# Patient Record
Sex: FEMALE | Race: WHITE | NOT HISPANIC OR LATINO | Employment: FULL TIME | URBAN - METROPOLITAN AREA
[De-identification: names, ages, dates, MRNs, and addresses within clinical notes are randomized per-mention and may not be internally consistent; named-entity substitution may affect disease eponyms.]

---

## 2024-07-02 RX ORDER — CARVEDILOL 3.12 MG/1
1.56 TABLET ORAL 2 TIMES DAILY WITH MEALS
COMMUNITY

## 2024-07-02 RX ORDER — SERTRALINE HYDROCHLORIDE 150 MG/1
150 CAPSULE ORAL DAILY
Status: ON HOLD | COMMUNITY
End: 2024-08-28

## 2024-07-02 RX ORDER — ISOSORBIDE DINITRATE 30 MG/1
30 TABLET ORAL
Status: ON HOLD | COMMUNITY
End: 2024-08-28

## 2024-07-02 RX ORDER — ATORVASTATIN CALCIUM 20 MG/1
20 TABLET, FILM COATED ORAL DAILY
Status: ON HOLD | COMMUNITY
End: 2024-08-28

## 2024-07-02 RX ORDER — CLOPIDOGREL BISULFATE 75 MG/1
75 TABLET ORAL DAILY
COMMUNITY

## 2024-07-02 RX ORDER — BUPROPION HYDROCHLORIDE 150 MG/1
150 TABLET ORAL EVERY MORNING
COMMUNITY

## 2024-07-02 RX ORDER — ASPIRIN 81 MG/1
81 TABLET ORAL AT BEDTIME
Status: ON HOLD | COMMUNITY
End: 2024-08-30

## 2024-08-06 NOTE — H&P (VIEW-ONLY)
PREOPERATIVE ASSESSMENT  Fax number: [phone] and [phone]      History of Present Illness  Rosario is here today alone for a preoperative consultation to evaluate the patient's perioperative risk prior to surgery.     Condition requiring surgery: Rosario has had left knee pain for the past several years.  She recalls receiving cortisone injections over a year ago which were not effective.  She was found to have advanced degenerative changes.  Due to persistence of symptoms she is wishing to undergo the procedure below.    Date of Surgery: August 20, 2024  Facility: [facility]  Surgeon: Christiano Hurst  Procedure: Left knee arthroplasty    Subjective   Do you ever have any pain or discomfort in your chest? no  Do you have swelling in your feet or ankles at times? no  Are you troubled by shortness of breath when:       Walking on the level? no       Walking up flight of stairs or slight hill? no       Sleeping at night? no  Do you sometimes get pains in the calves of your legs when you walk? no  Does your chest ever sound wheezy or whistling? no  Do you currently or in the last 2 weeks, have you had, a cold, bronchitis or other respiratory infection? no  Do you usually have a cough? no  Do you or does anyone in your family have a bleeding or clotting problem? no  Have you taken any aspirin, other blood thinners, or arthritis medicine in the last 2 weeks? yes  Have you ever had problems with anemia or been told to take iron pills? no  Have you had any black, tarry or bloody stools, (for women) abnormal vaginal bleeding? no  Have you or any of your relatives ever had problems with anesthesia? no  For Women: When was your last period? No LMP recorded. Patient has had an ablation.    Surgical Risk Factors:  Rosario does have a history of coronary artery disease.  Rosario has a history of CAD and over 2 years ago had a small non-Q wave infarct with no evidence of significant coronary artery disease so a question  of coronary spasm was entertained.  She last had a angiogram on 4/2/2022 was demonstrated left main is normal.  Left anterior descending artery has multiple stents in the distal segment.  The most very distal portion of the stents appears to be in a false lumen of a previous left coronary dissection.  The true lumen is widely patent.  This was seen on prior angiogram is unchanged.  Left circumflex is a large but nondominant and free of significant disease.  Right coronary artery is dominant vessel and free of significant disease.  That time medical management was recommended with statin therapy as well as aspirin indefinitely.    She does not have history of lung disease..  STOP-Bang Total Score: 3 (8/6/2024  7:26 AM)    Patient Active Problem List   Diagnosis     CAD (coronary artery disease)     Vitamin D deficiency     Status post percutaneous transluminal coronary angioplasty     Pure hypercholesterolemia     Major depressive disorder with single episode, in full remission ([facility])     Past Medical History:   Diagnosis Date     Anteroapical myocardial infarction ([facility]) 06/01/2012    periprocedural     ASCUS with positive high risk HPV 08/2014     CAD (coronary artery disease) 06/01/2012 4/2/2022: LHC: Stable coronary artery disease unchanged compared to prior angiogram, LAD has multiple stents in its distal segment. most very distal portion of the stents appears to be in a false lumen of a previous left coronary artery dissection.  The true lumen is widely patent. This was seen on prior angiograms and is unchanged 6/1/2012:  PCI to LAD 90% s/p angioplasty resulting in dissection      Depression      Miscarriage 1995     Mucous cyst of digit of left hand 08/28/2019     Non-ST elevated myocardial infarction ([facility]) 04/2022    s/p angiogram - stable disease     NSTEMI (non-ST elevated myocardial infarction) ([facility]) 11/04/2014    ASA increased to 325 mg for 6 months     Pseudoangiomatous stromal hyperplasia of breast  2013    right     TMJ syndrome     R>L     Ventricular thrombus following MI (myocardial infarction) ([facility]) 06/05/2012    noted on MRI     Vitamin D deficiency 2014     Past Surgical History:   Procedure Laterality Date     BREAST BIOPSY Right 10/03/2013    [facility]     CARDIAC CATHETERIZATION  06/01/2012    LAD angioplasty complicated by dissection and MI s/p 3 stents (Xience drug eluting and 2 bare metal), apical mural thrombus     CARDIAC CATHETERIZATION  11/05/2014    LAD 70% in-stent stenosis     CARDIAC CATHETERIZATION  03/04/2015    distal stent occluded     CARDIAC CATHETERIZATION  04/2022     COLPOSCOPY  10/14/2014     CORONARY STENT PLACEMENT  06/01/2012    3 stents     INSERTION OF INTRAUTERINE DEVICE  8/2001, 8/2011     IUD REMOVAL  03/2018    and uterine ablation     KNEE ARTHROSCOPY  02/15/2010    right lateral meniscus     KNEE ARTHROSCOPY Left 05/2021     SURGERY Right 10/31/2013    breast hematoma requiring evacuation     Current Outpatient Medications   Medication Sig     aspirin (AKA: CHILDREN'S ASPIRIN) 81 mg oral Tablet, Chewable Chew and Swallow 1 Tablet (81 mg) by mouth at bedtime.     atorvastatin (LIPITOR) 10 mg oral Tablet Take 1 Tablet (10 mg) by mouth at bedtime.     buPROPion (WELLBUTRIN XL) 150 mg oral Tablet Sustained Release 24HR Take 1 Tablet (150 mg) by mouth in the morning.     carvediloL (COREG) 3.125 mg oral Tablet Take 0.5 Tablets (1.5625 mg) by mouth in the morning and 0.5 Tablets (1.5625 mg) in the evening.     Cholecalciferol, Vitamin D3, (VITAMIN D3) 2,000 unit oral Capsule Take 2,000 Int'l Units by mouth Daily.     clopidogreL (PLAVIX) 75 mg oral Tablet Take 1 Tablet (75 mg) by mouth in the morning.     fish oil-omega-3 fatty acids 340-1,000 mg oral Capsule Take 1 Capsule by mouth in the evening.     isosorbide mononitrate (IMDUR) 30 mg oral Tablet Sustained Release 24HR Take 2 Tablets (60 mg) by mouth in the morning. (Patient taking differently: Take 1 Tablet (30 mg) by mouth  "in the morning.)     metroNIDAZOLE (METROCREAM) 0.75 % topical Cream Apply to the affected area(s) if needed.     multivitamin therapeutic oral Tablet Take 1 Tablet by mouth.     sertraline (ZOLOFT) 100 mg oral Tablet 1.5 tablets daily     traZODone (DESYREL) 50 mg oral Tablet Take 0.5-2 Tablets ( mg) by mouth at bedtime.     Allergies as of 08/06/2024 - Reviewed 08/06/2024   Allergen Reaction Noted     Codeine Unknown Reaction      Social History     Tobacco Use     Smoking status: Never     Smokeless tobacco: Never   Substance Use Topics     Alcohol use: Not Currently     Alcohol/week: 0.0 standard drinks of alcohol     Comment: ocassional drink      Social History     Substance and Sexual Activity   Drug Use No      Family History   Problem Relation Name Age of Onset     Other Mother          a fib s/p ablation, s/p pacemaker     Cancer (other) Father          Mantle cell lymphoma     Hypertension Father       Arthritis Sister Beatriz         B knee replacements, one hip replacement     Lung Cancer Sister Shahana 56       Review of Systems  A 10+ Review of Systems was completed and is negative, other than pertinent positives above.  Rosario has coronary artery disease and is currently on Plavix, aspirin, Lipitor, isosorbide and Coreg.  She denies chest pain, pressure or palpitations.  No new or worsening shortness of breath.  She takes sertraline and Wellbutrin for major depressive disorder which is currently in full remission.  She is tolerating well without side effects.  Stopping score today of 3.  Denies concerning symptoms.     Objective  /81 (BP Source: R arm, BP position: Sitting)   Pulse 66   Temp 97.1  F (36.2  C)   Ht 176 cm (69.29\")   Wt 77.8 kg (171 lb 8 oz)   Breastfeeding No   BMI 25.11 kg/m       Physical Exam   GENERAL: No acute distress.  SKIN:  No rashes, jaundice or suspicious lesions.  HEENT:  Head is atraumatic and normocephalic. Conjunctivae normal and sclera clear.  Ear canals " clear. TMs clear with normal landmarks. No oral lesions.  Throat without erythema or exudate.   NECK:  Neck supple without thyromegaly  LYMPH NODES:  No cervical or supraclavicular adenopathy.  LUNGS:  Clear with normal respirations. No crackles or wheezing.  HEART:  Regular without murmurs. No edema. Normal radial pulses.  ABDOMEN:  Soft, nontender and nondistended with normal bowel sounds, and no bruits, hepatosplenomegaly or masses.  MSK:  Normal muscle bulk. No effusions.  NEURO:  Alert and attentive. No tremor. Gait is normal. Motor exam without focal deficits.    WBC Count, Corrected   Date Value Ref Range Status   08/06/2024 3.7 3.7 - 12.1 10(3)/uL Final     RBC Count   Date Value Ref Range Status   08/06/2024 4.34 3.76 - 5.39 10(6)/uL Final     Hemoglobin   Date Value Ref Range Status   08/06/2024 13.4 11.2 - 15.8 g/dL Final     Hematocrit   Date Value Ref Range Status   08/06/2024 39.4 33.9 - 47.5 % Final     MCV   Date Value Ref Range Status   08/06/2024 90.8 80.6 - 98.5 fL Final     MCH   Date Value Ref Range Status   08/06/2024 30.9 26.4 - 32.9 pg Final     MCHC   Date Value Ref Range Status   08/06/2024 34.1 32.0 - 36.0 g/dL Final     RDW   Date Value Ref Range Status   08/06/2024 12.3 10.0 - 16.8 % Final     Platelets   Date Value Ref Range Status   08/06/2024 191 179 - 450 10(3)/uL Final     MPV   Date Value Ref Range Status   08/06/2024 8.4 7.4 - 10.4 fL Final     Abs Neutrophils   Date Value Ref Range Status   08/06/2024 2.0 1.8 - 9.2 10(3)/uL Final     % Neutrophils   Date Value Ref Range Status   08/06/2024 52.9 43.0 - 80.0 % Final     Abs Lymphocytes   Date Value Ref Range Status   08/06/2024 1.2 1.2 - 3.9 10(3)/uL Final     % Lymphocytes   Date Value Ref Range Status   08/06/2024 32.4 16.0 - 49.0 % Final     Abs Monocytes   Date Value Ref Range Status   08/06/2024 0.4 0.0 - 1.1 10(3)/uL Final     % Monocytes   Date Value Ref Range Status   08/06/2024 10.5 (H) 0.0 - 10.0 % Final     Abs  Eosinophils   Date Value Ref Range Status   08/06/2024 0.1 0.0 - 0.8 10(3)/uL Final     % Eosinophils   Date Value Ref Range Status   08/06/2024 3.6 0.0 - 7.0 % Final     Abs Basophils   Date Value Ref Range Status   08/06/2024 0.0 0.0 - 0.2 10(3)/uL Final     % Basophils   Date Value Ref Range Status   08/06/2024 0.6 0.0 - 2.0 % Final     ECG dated 5/28/2024:  Sinus bradycardia  ST and T wave abnormality, consider anterior ischemia  Abnormal ECG  When compared to ECG of May 19, 2023  No significant change was found    Personally reviewed each individual lab and ECG.      Assessment/ Plan  1.  Preoperative evaluation prior to undergoing left total knee arthroplasty; patient's health was optimized to undergo the above-named procedure.  She is hemodynamically stable.  Hemoglobin and potassium today are normal.  Electrocardiogram is unchanged.  She is instructed to hold all herbals and anti-inflammatories 1 week prior to surgery.  STOP-BANG score today of 3.  Denies concerning symptoms.  2.  Coronary artery disease.  She will take Coreg and isosorbide as scheduled a.m. of surgery.  She will hold Plavix and aspirin 5 days prior to surgery, unless otherwise directed by surgeon.  3.  Major depressive disorder, mild, currently in remission.  Continue current combination of Wellbutrin and sertraline.  4.  A copy of this preoperative evaluation will be forwarded to .    Thank you very much for this consultation with Mrs. HuizarQuetas.      Dori Smith, CNP

## 2024-08-23 RX ORDER — TRAZODONE HYDROCHLORIDE 50 MG/1
50-100 TABLET, FILM COATED ORAL AT BEDTIME
COMMUNITY
Start: 2024-01-23

## 2024-08-23 RX ORDER — CHLORAL HYDRATE 500 MG
1 CAPSULE ORAL DAILY
COMMUNITY

## 2024-08-23 RX ORDER — MULTIVITAMIN,THERAPEUTIC
1 TABLET ORAL DAILY
Status: ON HOLD | COMMUNITY
End: 2024-08-30

## 2024-08-27 ENCOUNTER — ANESTHESIA EVENT (OUTPATIENT)
Dept: SURGERY | Facility: CLINIC | Age: 54
End: 2024-08-27
Payer: COMMERCIAL

## 2024-08-27 NOTE — TREATMENT PLAN
"Orthopedic Surgery Pre-Op Plan: Rosario Walls  pre-op review. This is NOT an H&P   Surgeon: Dr. Hurst   [facility]: [facility]  Name of Surgery: Left Total Knee Arthroplasty   Date of Surgery: 8/28/24  H&P: Completed on 8/6/24 by MALAIKA Truong, CNP at [facility] [place], [place].   History of ASA, NSAIDS, vitamin and/or herbal supplements, GLP-1 Agonist or SGLT Inhibitor medication taken within 10 days?: Yes: on ASA 81 mg daily for CAD S/P stents x3 to LAD in 2012. Due to CAD and stent history, patient will continue to take ASA 81 mg daily through day before surgery (last dose on 8/27/24) and hold on day of surgery.  Fish oil, multivitamins-patient instructed to hold these supplements and vitamins for 7 days before surgery.  History of blood thinners?: Yes: on dual antiplatelet therapy with clopidogrel (Plavix) and ASA 81 mg daily. Per Cardiology recommendations, patient will hold Plavix 5 days before surgery (take last dose on 8/22/24, then hold). We will alert Anesthesia of 5 day Plavix hold in case this impacts ability for patient to have spinal/neuraxial anesthesia with this knee surgery.     Plan:   1) Discharge Plan: Home morning of POD 1 with assist of Family (Spouse, Mikie).  Please see Discharge Planning section near bottom of this note for further details.     2) Coronary Artery Disease: History of MI in 2012 and S/P Stents x3 in 2012: Doing well from cardiovascular standpoint at recent Cardiology visit with Anny Novak PA-C, [facility], from 8/23/24: Denies any resting or exertional chest pain or shortness of breath. Denies PND, apnea or lower extremity edema. Able to perform > 4 METS without any limitations. Per Cardiology:   \" From a cardiac standpoint Rosario appears to be optimized. She denies recurrent angina and has no heart failure symptoms. She can perform at least 4 METS of activity without limitations. Given her prior cardiac history she would be considered at " "least moderate risk to undergo anesthesia. EKG reviewed from May without [place] can EKG changes. Rosario can undergo scheduled surgery as planned. Please contact our clinic for any questions or concerns.\"     Patient also cleared by PCP for surgery. On dual antiplatelet therapy with ASA 81 mg daily and clopidogrel (Plavix).  Patient was instructed by Cardiology to hold Plavix for 5 days before surgery (will take last dose on 8/22/2024, then hold).  Will also stay on ASA 81 mg through the day before surgery (will take last dose on 8/27/2024, then hold on day of surgery).  After surgery, we will plan to resume both ASA 81 mg and Plavix as soon as safe from a bleeding standpoint per Dr. Hurst's team.     3) Hyperlipidemia: On atorvastatin.    4) Hypertension: Appears well-controlled on carvedilol.  Patient was instructed to continue taking carvedilol, including taking it on the morning of surgery.    5) Depression: On sertraline and bupropion.    Patient appears medically optimized for upcoming surgery. I would recommend Hospitalist Consult to assist with medical management. Please call me below with any questions on this patient.       Review of Systems Notable for: Coronary artery disease-history of MI in 2012 and s/p stents x 3 in 2012, hyperlipidemia, hypertension, depression.    Past Medical History:   Past Medical History:   Diagnosis Date    Antiplatelet or antithrombotic long-term use     Arthritis     Coronary artery disease     Heart attack (H)     Hypertension     Stented coronary artery      History reviewed. No pertinent surgical history.    Current Medications:  Patient's Medications   New Prescriptions    No medications on file   Previous Medications    ASPIRIN 81 MG EC TABLET    Take 81 mg by mouth daily Last dose to be taken on 8/27    ATORVASTATIN (LIPITOR) 20 MG TABLET    Take 20 mg by mouth daily    BUPROPION (WELLBUTRIN XL) 150 MG 24 HR TABLET    Take 150 mg by mouth every morning    CARVEDILOL " (COREG) 3.125 MG TABLET    Take 3.125 mg by mouth 2 times daily (with meals)    CLOPIDOGREL (PLAVIX) 75 MG TABLET    Take 75 mg by mouth daily Last dose to be taken on 8/20. PCP wants last dose to be on 8/23, pt to call MALAIKA Bailey NP-C for follow up and clarification.    FISH OIL-OMEGA-3 FATTY ACIDS 1000 MG CAPSULE    Take 1 capsule by mouth daily.    ISOSORBIDE DINITRATE (ISORDIL) 30 MG TABLET    Take 30 mg by mouth 3 times daily    MULTIPLE VITAMIN (THERA-TABS) TABS    Take 1 tablet by mouth daily.    SERTRALINE  MG CAPS    Take 150 mg by mouth daily.    TRAZODONE (DESYREL) 50 MG TABLET    Take 0.5-2 tablets by mouth at bedtime.   Modified Medications    No medications on file   Discontinued Medications    No medications on file       ALLERGIES:  Allergies   Allergen Reactions    Codeine        Social History  Social History     Tobacco Use    Smoking status: Never    Smokeless tobacco: Never   Substance Use Topics    Alcohol use: Yes     Comment: 2-3 per week    Drug use: Not Currently       Any Abnormal Recent Diagnostics? Yes  Blood glucose 102 on 8/6/2024: Very mildly elevated.  No known history of prediabetes or diabetes.  We will monitor BG's during hospital stay.  Goal BG is <180.     Discharge Planning:     Home morning of POD 1 with assist of Family (Spouse, Mikie).    07/02/24 1158   Discharge Planning   Patient/Family Anticipates Transition to home with family   Living Arrangements   People in Home spouse   Type of Residence Private Residence   Is your private residence a single family home or apartment? Single family home   Number of Stairs, Within Home, Primary ten   Stair Railings, Within Home, Primary railings safe and in good condition   Bathroom Shower/Tub Tub/Shower unit   Equipment Currently Used at Home none   Support System   Support Systems Spouse/Significant Other   Do you have someone available to stay with you one or two nights once you are home? Yes       MALAIKA Arias,  CNP   Advanced Practice Nurse Navigator- Orthopedics  [facility]   Phone: [phone]

## 2024-08-28 ENCOUNTER — HOSPITAL ENCOUNTER (OUTPATIENT)
Facility: CLINIC | Age: 54
Discharge: HOME | End: 2024-08-30
Attending: STUDENT IN AN ORGANIZED HEALTH CARE EDUCATION/TRAINING PROGRAM | Admitting: STUDENT IN AN ORGANIZED HEALTH CARE EDUCATION/TRAINING PROGRAM
Payer: COMMERCIAL

## 2024-08-28 ENCOUNTER — ANESTHESIA (OUTPATIENT)
Dept: SURGERY | Facility: CLINIC | Age: 54
End: 2024-08-28
Payer: COMMERCIAL

## 2024-08-28 ENCOUNTER — ANCILLARY PROCEDURE (OUTPATIENT)
Dept: ULTRASOUND IMAGING | Facility: CLINIC | Age: 54
End: 2024-08-28
Payer: COMMERCIAL

## 2024-08-28 ENCOUNTER — APPOINTMENT (OUTPATIENT)
Dept: RADIOLOGY | Facility: CLINIC | Age: 54
End: 2024-08-28
Attending: STUDENT IN AN ORGANIZED HEALTH CARE EDUCATION/TRAINING PROGRAM
Payer: COMMERCIAL

## 2024-08-28 DIAGNOSIS — M17.12 PRIMARY OSTEOARTHRITIS OF LEFT KNEE: Primary | ICD-10-CM

## 2024-08-28 LAB — GLUCOSE BLDC GLUCOMTR-MCNC: 96 MG/DL (ref 70–99)

## 2024-08-28 PROCEDURE — 250N000013 HC RX MED GY IP 250 OP 250 PS 637

## 2024-08-28 PROCEDURE — 370N000017 HC ANESTHESIA TECHNICAL FEE, PER MIN: Performed by: STUDENT IN AN ORGANIZED HEALTH CARE EDUCATION/TRAINING PROGRAM

## 2024-08-28 PROCEDURE — C1776 JOINT DEVICE (IMPLANTABLE): HCPCS | Performed by: STUDENT IN AN ORGANIZED HEALTH CARE EDUCATION/TRAINING PROGRAM

## 2024-08-28 PROCEDURE — 999N000065 XR KNEE PORT LEFT 1/2 VIEWS: Mod: LT

## 2024-08-28 PROCEDURE — 258N000003 HC RX IP 258 OP 636

## 2024-08-28 PROCEDURE — 250N000009 HC RX 250: Performed by: NURSE ANESTHETIST, CERTIFIED REGISTERED

## 2024-08-28 PROCEDURE — 250N000013 HC RX MED GY IP 250 OP 250 PS 637: Performed by: STUDENT IN AN ORGANIZED HEALTH CARE EDUCATION/TRAINING PROGRAM

## 2024-08-28 PROCEDURE — 250N000011 HC RX IP 250 OP 636

## 2024-08-28 PROCEDURE — 710N000010 HC RECOVERY PHASE 1, LEVEL 2, PER MIN: Performed by: STUDENT IN AN ORGANIZED HEALTH CARE EDUCATION/TRAINING PROGRAM

## 2024-08-28 PROCEDURE — 258N000001 HC RX 258: Performed by: STUDENT IN AN ORGANIZED HEALTH CARE EDUCATION/TRAINING PROGRAM

## 2024-08-28 PROCEDURE — 250N000011 HC RX IP 250 OP 636: Performed by: STUDENT IN AN ORGANIZED HEALTH CARE EDUCATION/TRAINING PROGRAM

## 2024-08-28 PROCEDURE — 999N000141 HC STATISTIC PRE-PROCEDURE NURSING ASSESSMENT: Performed by: STUDENT IN AN ORGANIZED HEALTH CARE EDUCATION/TRAINING PROGRAM

## 2024-08-28 PROCEDURE — 250N000009 HC RX 250: Performed by: STUDENT IN AN ORGANIZED HEALTH CARE EDUCATION/TRAINING PROGRAM

## 2024-08-28 PROCEDURE — 272N000001 HC OR GENERAL SUPPLY STERILE: Performed by: STUDENT IN AN ORGANIZED HEALTH CARE EDUCATION/TRAINING PROGRAM

## 2024-08-28 PROCEDURE — 250N000025 HC SEVOFLURANE, PER MIN: Performed by: STUDENT IN AN ORGANIZED HEALTH CARE EDUCATION/TRAINING PROGRAM

## 2024-08-28 PROCEDURE — 82962 GLUCOSE BLOOD TEST: CPT

## 2024-08-28 PROCEDURE — 250N000009 HC RX 250

## 2024-08-28 PROCEDURE — 360N000077 HC SURGERY LEVEL 4, PER MIN: Performed by: STUDENT IN AN ORGANIZED HEALTH CARE EDUCATION/TRAINING PROGRAM

## 2024-08-28 PROCEDURE — 258N000003 HC RX IP 258 OP 636: Performed by: NURSE ANESTHETIST, CERTIFIED REGISTERED

## 2024-08-28 PROCEDURE — 250N000011 HC RX IP 250 OP 636: Performed by: NURSE ANESTHETIST, CERTIFIED REGISTERED

## 2024-08-28 PROCEDURE — C1713 ANCHOR/SCREW BN/BN,TIS/BN: HCPCS | Performed by: STUDENT IN AN ORGANIZED HEALTH CARE EDUCATION/TRAINING PROGRAM

## 2024-08-28 PROCEDURE — 258N000003 HC RX IP 258 OP 636: Performed by: STUDENT IN AN ORGANIZED HEALTH CARE EDUCATION/TRAINING PROGRAM

## 2024-08-28 DEVICE — CRUCIATE RETAINING FEMORAL
Type: IMPLANTABLE DEVICE | Site: KNEE | Status: FUNCTIONAL
Brand: TRIATHLON

## 2024-08-28 DEVICE — BONE CEMENT SIMPLEX FULL DOSE 6191-1-001: Type: IMPLANTABLE DEVICE | Site: KNEE | Status: FUNCTIONAL

## 2024-08-28 DEVICE — TIBIAL BEARING INSERT - CS
Type: IMPLANTABLE DEVICE | Site: KNEE | Status: FUNCTIONAL
Brand: TRIATHLON

## 2024-08-28 DEVICE — PRIMARY TIBIAL BASEPLATE
Type: IMPLANTABLE DEVICE | Site: KNEE | Status: FUNCTIONAL
Brand: TRIATHLON

## 2024-08-28 DEVICE — PIN FIXATION SS FLUTE SQUARE L3.5 IN OD1/8 IN 7650-2038A: Type: IMPLANTABLE DEVICE | Site: KNEE | Status: FUNCTIONAL

## 2024-08-28 RX ORDER — CARVEDILOL 3.12 MG/1
1.56 TABLET, FILM COATED ORAL 2 TIMES DAILY WITH MEALS
Status: DISCONTINUED | OUTPATIENT
Start: 2024-08-28 | End: 2024-08-30 | Stop reason: HOSPADM

## 2024-08-28 RX ORDER — HYDROMORPHONE HCL IN WATER/PF 6 MG/30 ML
0.2 PATIENT CONTROLLED ANALGESIA SYRINGE INTRAVENOUS
Status: DISCONTINUED | OUTPATIENT
Start: 2024-08-28 | End: 2024-08-30 | Stop reason: HOSPADM

## 2024-08-28 RX ORDER — ONDANSETRON 4 MG/1
4 TABLET, ORALLY DISINTEGRATING ORAL EVERY 6 HOURS PRN
Status: DISCONTINUED | OUTPATIENT
Start: 2024-08-28 | End: 2024-08-30 | Stop reason: HOSPADM

## 2024-08-28 RX ORDER — CEPHALEXIN 500 MG/1
500 CAPSULE ORAL EVERY 6 HOURS SCHEDULED
Status: DISCONTINUED | OUTPATIENT
Start: 2024-08-29 | End: 2024-08-30 | Stop reason: HOSPADM

## 2024-08-28 RX ORDER — TRANEXAMIC ACID 650 MG/1
1950 TABLET ORAL ONCE
Status: COMPLETED | OUTPATIENT
Start: 2024-08-28 | End: 2024-08-28

## 2024-08-28 RX ORDER — CEFAZOLIN SODIUM/WATER 2 G/20 ML
2 SYRINGE (ML) INTRAVENOUS
Status: COMPLETED | OUTPATIENT
Start: 2024-08-28 | End: 2024-08-28

## 2024-08-28 RX ORDER — SODIUM CHLORIDE, SODIUM LACTATE, POTASSIUM CHLORIDE, CALCIUM CHLORIDE 600; 310; 30; 20 MG/100ML; MG/100ML; MG/100ML; MG/100ML
INJECTION, SOLUTION INTRAVENOUS CONTINUOUS
Status: DISCONTINUED | OUTPATIENT
Start: 2024-08-28 | End: 2024-08-29

## 2024-08-28 RX ORDER — ONDANSETRON 2 MG/ML
INJECTION INTRAMUSCULAR; INTRAVENOUS PRN
Status: DISCONTINUED | OUTPATIENT
Start: 2024-08-28 | End: 2024-08-28

## 2024-08-28 RX ORDER — ISOSORBIDE MONONITRATE 30 MG/1
30 TABLET, EXTENDED RELEASE ORAL DAILY
Status: DISCONTINUED | OUTPATIENT
Start: 2024-08-29 | End: 2024-08-30 | Stop reason: HOSPADM

## 2024-08-28 RX ORDER — CEFAZOLIN SODIUM/WATER 2 G/20 ML
2 SYRINGE (ML) INTRAVENOUS SEE ADMIN INSTRUCTIONS
Status: DISCONTINUED | OUTPATIENT
Start: 2024-08-28 | End: 2024-08-30 | Stop reason: HOSPADM

## 2024-08-28 RX ORDER — ONDANSETRON 2 MG/ML
4 INJECTION INTRAMUSCULAR; INTRAVENOUS EVERY 6 HOURS PRN
Status: DISCONTINUED | OUTPATIENT
Start: 2024-08-28 | End: 2024-08-30 | Stop reason: HOSPADM

## 2024-08-28 RX ORDER — LIDOCAINE 40 MG/G
CREAM TOPICAL
Status: DISCONTINUED | OUTPATIENT
Start: 2024-08-28 | End: 2024-08-30 | Stop reason: HOSPADM

## 2024-08-28 RX ORDER — BUPROPION HYDROCHLORIDE 150 MG/1
150 TABLET ORAL EVERY MORNING
Status: DISCONTINUED | OUTPATIENT
Start: 2024-08-29 | End: 2024-08-30 | Stop reason: HOSPADM

## 2024-08-28 RX ORDER — ATORVASTATIN CALCIUM 10 MG/1
10 TABLET, FILM COATED ORAL AT BEDTIME
Status: DISCONTINUED | OUTPATIENT
Start: 2024-08-28 | End: 2024-08-30 | Stop reason: HOSPADM

## 2024-08-28 RX ORDER — BISACODYL 10 MG
10 SUPPOSITORY, RECTAL RECTAL DAILY PRN
Status: DISCONTINUED | OUTPATIENT
Start: 2024-08-31 | End: 2024-08-30 | Stop reason: HOSPADM

## 2024-08-28 RX ORDER — MAGNESIUM HYDROXIDE 1200 MG/15ML
LIQUID ORAL PRN
Status: DISCONTINUED | OUTPATIENT
Start: 2024-08-28 | End: 2024-08-28 | Stop reason: HOSPADM

## 2024-08-28 RX ORDER — AMOXICILLIN 250 MG
1 CAPSULE ORAL 2 TIMES DAILY
Status: DISCONTINUED | OUTPATIENT
Start: 2024-08-28 | End: 2024-08-30 | Stop reason: HOSPADM

## 2024-08-28 RX ORDER — VITAMIN B COMPLEX
25 TABLET ORAL DAILY
Status: DISCONTINUED | OUTPATIENT
Start: 2024-08-29 | End: 2024-08-30 | Stop reason: HOSPADM

## 2024-08-28 RX ORDER — LIDOCAINE HYDROCHLORIDE 10 MG/ML
INJECTION, SOLUTION INFILTRATION; PERINEURAL PRN
Status: DISCONTINUED | OUTPATIENT
Start: 2024-08-28 | End: 2024-08-28

## 2024-08-28 RX ORDER — HYDROMORPHONE HCL IN WATER/PF 6 MG/30 ML
0.2 PATIENT CONTROLLED ANALGESIA SYRINGE INTRAVENOUS EVERY 5 MIN PRN
Status: DISCONTINUED | OUTPATIENT
Start: 2024-08-28 | End: 2024-08-28 | Stop reason: HOSPADM

## 2024-08-28 RX ORDER — OXYCODONE HYDROCHLORIDE 5 MG/1
10 TABLET ORAL EVERY 4 HOURS PRN
Status: DISCONTINUED | OUTPATIENT
Start: 2024-08-28 | End: 2024-08-30 | Stop reason: HOSPADM

## 2024-08-28 RX ORDER — OXYCODONE HYDROCHLORIDE 5 MG/1
5 TABLET ORAL EVERY 4 HOURS PRN
Status: DISCONTINUED | OUTPATIENT
Start: 2024-08-28 | End: 2024-08-30 | Stop reason: HOSPADM

## 2024-08-28 RX ORDER — ATORVASTATIN CALCIUM 10 MG/1
10 TABLET, FILM COATED ORAL AT BEDTIME
COMMUNITY

## 2024-08-28 RX ORDER — BUPIVACAINE HYDROCHLORIDE 7.5 MG/ML
INJECTION, SOLUTION INTRASPINAL
Status: COMPLETED | OUTPATIENT
Start: 2024-08-28 | End: 2024-08-28

## 2024-08-28 RX ORDER — GLYCOPYRROLATE 0.2 MG/ML
INJECTION, SOLUTION INTRAMUSCULAR; INTRAVENOUS PRN
Status: DISCONTINUED | OUTPATIENT
Start: 2024-08-28 | End: 2024-08-28

## 2024-08-28 RX ORDER — SODIUM CHLORIDE, SODIUM LACTATE, POTASSIUM CHLORIDE, CALCIUM CHLORIDE 600; 310; 30; 20 MG/100ML; MG/100ML; MG/100ML; MG/100ML
INJECTION, SOLUTION INTRAVENOUS CONTINUOUS
Status: DISCONTINUED | OUTPATIENT
Start: 2024-08-28 | End: 2024-08-28 | Stop reason: HOSPADM

## 2024-08-28 RX ORDER — PROPOFOL 10 MG/ML
INJECTION, EMULSION INTRAVENOUS PRN
Status: DISCONTINUED | OUTPATIENT
Start: 2024-08-28 | End: 2024-08-28

## 2024-08-28 RX ORDER — ONDANSETRON 2 MG/ML
4 INJECTION INTRAMUSCULAR; INTRAVENOUS EVERY 30 MIN PRN
Status: DISCONTINUED | OUTPATIENT
Start: 2024-08-28 | End: 2024-08-28 | Stop reason: HOSPADM

## 2024-08-28 RX ORDER — FENTANYL CITRATE 50 UG/ML
25-100 INJECTION, SOLUTION INTRAMUSCULAR; INTRAVENOUS
Status: DISCONTINUED | OUTPATIENT
Start: 2024-08-28 | End: 2024-08-29

## 2024-08-28 RX ORDER — TRAZODONE HYDROCHLORIDE 50 MG/1
50-100 TABLET, FILM COATED ORAL AT BEDTIME
Status: DISCONTINUED | OUTPATIENT
Start: 2024-08-28 | End: 2024-08-30 | Stop reason: HOSPADM

## 2024-08-28 RX ORDER — ACETAMINOPHEN 325 MG/1
650 TABLET ORAL EVERY 4 HOURS PRN
Status: DISCONTINUED | OUTPATIENT
Start: 2024-08-31 | End: 2024-08-30 | Stop reason: HOSPADM

## 2024-08-28 RX ORDER — PROPOFOL 10 MG/ML
INJECTION, EMULSION INTRAVENOUS CONTINUOUS PRN
Status: DISCONTINUED | OUTPATIENT
Start: 2024-08-28 | End: 2024-08-28

## 2024-08-28 RX ORDER — SODIUM CHLORIDE, SODIUM LACTATE, POTASSIUM CHLORIDE, CALCIUM CHLORIDE 600; 310; 30; 20 MG/100ML; MG/100ML; MG/100ML; MG/100ML
INJECTION, SOLUTION INTRAVENOUS CONTINUOUS
Status: DISCONTINUED | OUTPATIENT
Start: 2024-08-28 | End: 2024-08-30

## 2024-08-28 RX ORDER — ACETAMINOPHEN 325 MG/1
975 TABLET ORAL ONCE
Status: COMPLETED | OUTPATIENT
Start: 2024-08-28 | End: 2024-08-28

## 2024-08-28 RX ORDER — HYDROMORPHONE HCL IN WATER/PF 6 MG/30 ML
0.4 PATIENT CONTROLLED ANALGESIA SYRINGE INTRAVENOUS
Status: DISCONTINUED | OUTPATIENT
Start: 2024-08-28 | End: 2024-08-30 | Stop reason: HOSPADM

## 2024-08-28 RX ORDER — POLYETHYLENE GLYCOL 3350 17 G/17G
17 POWDER, FOR SOLUTION ORAL DAILY
Status: DISCONTINUED | OUTPATIENT
Start: 2024-08-29 | End: 2024-08-30 | Stop reason: HOSPADM

## 2024-08-28 RX ORDER — SERTRALINE HYDROCHLORIDE 100 MG/1
150 TABLET, FILM COATED ORAL AT BEDTIME
COMMUNITY

## 2024-08-28 RX ORDER — DEXAMETHASONE SODIUM PHOSPHATE 10 MG/ML
INJECTION, SOLUTION INTRAMUSCULAR; INTRAVENOUS PRN
Status: DISCONTINUED | OUTPATIENT
Start: 2024-08-28 | End: 2024-08-28

## 2024-08-28 RX ORDER — FENTANYL CITRATE 50 UG/ML
25 INJECTION, SOLUTION INTRAMUSCULAR; INTRAVENOUS EVERY 5 MIN PRN
Status: DISCONTINUED | OUTPATIENT
Start: 2024-08-28 | End: 2024-08-28 | Stop reason: HOSPADM

## 2024-08-28 RX ORDER — HYDROMORPHONE HCL IN WATER/PF 6 MG/30 ML
0.4 PATIENT CONTROLLED ANALGESIA SYRINGE INTRAVENOUS EVERY 5 MIN PRN
Status: DISCONTINUED | OUTPATIENT
Start: 2024-08-28 | End: 2024-08-28 | Stop reason: HOSPADM

## 2024-08-28 RX ORDER — ONDANSETRON 4 MG/1
4 TABLET, ORALLY DISINTEGRATING ORAL EVERY 30 MIN PRN
Status: DISCONTINUED | OUTPATIENT
Start: 2024-08-28 | End: 2024-08-28 | Stop reason: HOSPADM

## 2024-08-28 RX ORDER — DEXAMETHASONE SODIUM PHOSPHATE 4 MG/ML
4 INJECTION, SOLUTION INTRA-ARTICULAR; INTRALESIONAL; INTRAMUSCULAR; INTRAVENOUS; SOFT TISSUE
Status: DISCONTINUED | OUTPATIENT
Start: 2024-08-28 | End: 2024-08-28 | Stop reason: HOSPADM

## 2024-08-28 RX ORDER — CEFAZOLIN SODIUM 1 G/3ML
1 INJECTION, POWDER, FOR SOLUTION INTRAMUSCULAR; INTRAVENOUS EVERY 8 HOURS
Status: COMPLETED | OUTPATIENT
Start: 2024-08-28 | End: 2024-08-29

## 2024-08-28 RX ORDER — MULTIVITAMIN,THERAPEUTIC
1 TABLET ORAL DAILY
Status: DISCONTINUED | OUTPATIENT
Start: 2024-08-29 | End: 2024-08-30 | Stop reason: HOSPADM

## 2024-08-28 RX ORDER — ACETAMINOPHEN 325 MG/1
975 TABLET ORAL EVERY 8 HOURS
Status: DISCONTINUED | OUTPATIENT
Start: 2024-08-28 | End: 2024-08-30 | Stop reason: HOSPADM

## 2024-08-28 RX ORDER — NALOXONE HYDROCHLORIDE 0.4 MG/ML
0.1 INJECTION, SOLUTION INTRAMUSCULAR; INTRAVENOUS; SUBCUTANEOUS
Status: DISCONTINUED | OUTPATIENT
Start: 2024-08-28 | End: 2024-08-28 | Stop reason: HOSPADM

## 2024-08-28 RX ORDER — ASPIRIN 81 MG/1
81 TABLET ORAL 2 TIMES DAILY
Status: DISCONTINUED | OUTPATIENT
Start: 2024-08-28 | End: 2024-08-30 | Stop reason: HOSPADM

## 2024-08-28 RX ORDER — ISOSORBIDE MONONITRATE 30 MG/1
30 TABLET, EXTENDED RELEASE ORAL DAILY
COMMUNITY
Start: 2024-08-26

## 2024-08-28 RX ORDER — CHOLECALCIFEROL (VITD3)/VIT K2 25-90 MCG
1 TABLET,DISINTEGRATING ORAL DAILY
COMMUNITY

## 2024-08-28 RX ORDER — BUPIVACAINE HYDROCHLORIDE 5 MG/ML
INJECTION, SOLUTION EPIDURAL; INTRACAUDAL
Status: COMPLETED | OUTPATIENT
Start: 2024-08-28 | End: 2024-08-28

## 2024-08-28 RX ORDER — CLOPIDOGREL BISULFATE 75 MG/1
75 TABLET ORAL DAILY
Status: DISCONTINUED | OUTPATIENT
Start: 2024-08-29 | End: 2024-08-30 | Stop reason: HOSPADM

## 2024-08-28 RX ORDER — PROCHLORPERAZINE MALEATE 10 MG
10 TABLET ORAL EVERY 6 HOURS PRN
Status: DISCONTINUED | OUTPATIENT
Start: 2024-08-28 | End: 2024-08-30 | Stop reason: HOSPADM

## 2024-08-28 RX ORDER — FENTANYL CITRATE 50 UG/ML
50 INJECTION, SOLUTION INTRAMUSCULAR; INTRAVENOUS EVERY 5 MIN PRN
Status: DISCONTINUED | OUTPATIENT
Start: 2024-08-28 | End: 2024-08-28 | Stop reason: HOSPADM

## 2024-08-28 RX ADMIN — SODIUM CHLORIDE, POTASSIUM CHLORIDE, SODIUM LACTATE AND CALCIUM CHLORIDE: 600; 310; 30; 20 INJECTION, SOLUTION INTRAVENOUS at 17:33

## 2024-08-28 RX ADMIN — PHENYLEPHRINE HYDROCHLORIDE 0.3 MCG/KG/MIN: 10 INJECTION INTRAVENOUS at 11:51

## 2024-08-28 RX ADMIN — ACETAMINOPHEN 975 MG: 325 TABLET ORAL at 16:09

## 2024-08-28 RX ADMIN — Medication 2 G: at 11:40

## 2024-08-28 RX ADMIN — FENTANYL CITRATE 25 MCG: 50 INJECTION, SOLUTION INTRAMUSCULAR; INTRAVENOUS at 14:23

## 2024-08-28 RX ADMIN — HYDROMORPHONE HYDROCHLORIDE 0.4 MG: 0.2 INJECTION, SOLUTION INTRAMUSCULAR; INTRAVENOUS; SUBCUTANEOUS at 23:28

## 2024-08-28 RX ADMIN — LIDOCAINE HYDROCHLORIDE 4 ML: 10 INJECTION, SOLUTION INFILTRATION; PERINEURAL at 12:29

## 2024-08-28 RX ADMIN — FENTANYL CITRATE 100 MCG: 50 INJECTION, SOLUTION INTRAMUSCULAR; INTRAVENOUS at 11:34

## 2024-08-28 RX ADMIN — PROPOFOL 120 MG: 10 INJECTION, EMULSION INTRAVENOUS at 12:29

## 2024-08-28 RX ADMIN — PROPOFOL 80 MCG/KG/MIN: 10 INJECTION, EMULSION INTRAVENOUS at 11:49

## 2024-08-28 RX ADMIN — MIDAZOLAM HYDROCHLORIDE 2 MG: 1 INJECTION, SOLUTION INTRAMUSCULAR; INTRAVENOUS at 11:34

## 2024-08-28 RX ADMIN — SENNOSIDES AND DOCUSATE SODIUM 1 TABLET: 50; 8.6 TABLET ORAL at 19:55

## 2024-08-28 RX ADMIN — TRAZODONE HYDROCHLORIDE 50 MG: 50 TABLET ORAL at 21:34

## 2024-08-28 RX ADMIN — OXYCODONE HYDROCHLORIDE 10 MG: 5 TABLET ORAL at 20:30

## 2024-08-28 RX ADMIN — PROPOFOL 30 MG: 10 INJECTION, EMULSION INTRAVENOUS at 12:30

## 2024-08-28 RX ADMIN — DEXAMETHASONE SODIUM PHOSPHATE 4 MG: 10 INJECTION, SOLUTION INTRAMUSCULAR; INTRAVENOUS at 11:52

## 2024-08-28 RX ADMIN — ACETAMINOPHEN 975 MG: 325 TABLET ORAL at 10:25

## 2024-08-28 RX ADMIN — GLYCOPYRROLATE 0.2 MG: 0.2 INJECTION INTRAMUSCULAR; INTRAVENOUS at 13:09

## 2024-08-28 RX ADMIN — TRANEXAMIC ACID 1950 MG: 650 TABLET ORAL at 10:24

## 2024-08-28 RX ADMIN — CEFAZOLIN 1 G: 1 INJECTION, POWDER, FOR SOLUTION INTRAMUSCULAR; INTRAVENOUS at 19:52

## 2024-08-28 RX ADMIN — GLYCOPYRROLATE 0.2 MG: 0.2 INJECTION INTRAMUSCULAR; INTRAVENOUS at 11:52

## 2024-08-28 RX ADMIN — SODIUM CHLORIDE, POTASSIUM CHLORIDE, SODIUM LACTATE AND CALCIUM CHLORIDE: 600; 310; 30; 20 INJECTION, SOLUTION INTRAVENOUS at 14:20

## 2024-08-28 RX ADMIN — ONDANSETRON 4 MG: 2 INJECTION INTRAMUSCULAR; INTRAVENOUS at 11:52

## 2024-08-28 RX ADMIN — BUPIVACAINE HYDROCHLORIDE 15 ML: 5 INJECTION, SOLUTION EPIDURAL; INTRACAUDAL; PERINEURAL at 11:34

## 2024-08-28 RX ADMIN — ATORVASTATIN CALCIUM 10 MG: 10 TABLET, FILM COATED ORAL at 21:34

## 2024-08-28 RX ADMIN — OXYCODONE HYDROCHLORIDE 5 MG: 5 TABLET ORAL at 16:09

## 2024-08-28 RX ADMIN — DEXMEDETOMIDINE HYDROCHLORIDE 12 MCG: 100 INJECTION, SOLUTION INTRAVENOUS at 11:50

## 2024-08-28 RX ADMIN — ASPIRIN 81 MG: 81 TABLET, COATED ORAL at 19:55

## 2024-08-28 RX ADMIN — SODIUM CHLORIDE, POTASSIUM CHLORIDE, SODIUM LACTATE AND CALCIUM CHLORIDE: 600; 310; 30; 20 INJECTION, SOLUTION INTRAVENOUS at 11:04

## 2024-08-28 RX ADMIN — LIDOCAINE HYDROCHLORIDE 1 ML: 10 INJECTION, SOLUTION INFILTRATION; PERINEURAL at 11:50

## 2024-08-28 RX ADMIN — SERTRALINE 150 MG: 100 TABLET, FILM COATED ORAL at 21:34

## 2024-08-28 RX ADMIN — ACETAMINOPHEN 975 MG: 325 TABLET ORAL at 20:30

## 2024-08-28 RX ADMIN — PHENYLEPHRINE HYDROCHLORIDE 100 MCG: 10 INJECTION INTRAVENOUS at 13:06

## 2024-08-28 RX ADMIN — BUPIVACAINE HYDROCHLORIDE IN DEXTROSE 1.6 ML: 7.5 INJECTION, SOLUTION SUBARACHNOID at 11:46

## 2024-08-28 ASSESSMENT — ACTIVITIES OF DAILY LIVING (ADL)
ADLS_ACUITY_SCORE: 18
ADLS_ACUITY_SCORE: 16
ADLS_ACUITY_SCORE: 18
ADLS_ACUITY_SCORE: 27
ADLS_ACUITY_SCORE: 18

## 2024-08-28 NOTE — PHARMACY-ADMISSION MEDICATION HISTORY
Pharmacist AMANDA Medication History    Admission medication history is complete. The information provided in this note is only as accurate as the sources available at the time of the update.    Medication reconciliation/reorder completed by provider prior to medication history? No    Information Source(s): Patient and Clinic records and Care Everywhere via in-person    Pertinent Information: N/A    Allergies reviewed with patient and updates made in EHR: yes    Medications available for use during hospital stay: None.      Medication History Completed By: Gigi Downs [facility] 8/28/2024 10:59 AM    PTA Med List   Medication Sig Last Dose    aspirin 81 MG EC tablet Take 81 mg by mouth at bedtime. 8/27/2024 at PM    atorvastatin (LIPITOR) 10 MG tablet Take 10 mg by mouth at bedtime. 8/27/2024 at PM    buPROPion (WELLBUTRIN XL) 150 MG 24 hr tablet Take 150 mg by mouth every morning 8/28/2024 at AM    carvedilol (COREG) 3.125 MG tablet Take 1.562 mg by mouth 2 times daily (with meals). Half-tablet 8/28/2024 at AM    clopidogrel (PLAVIX) 75 MG tablet Take 75 mg by mouth daily Last dose to be taken on 8/20. PCP wants last dose to be on 8/23, pt to call MALAIKA Bailey NPGiselleC for follow up and clarification. 8/22/2024 at AM    fish oil-omega-3 fatty acids 1000 MG capsule Take 1 capsule by mouth daily. 8/21/2024 at AM    isosorbide mononitrate (IMDUR) 30 MG 24 hr tablet Take 30 mg by mouth daily. 8/28/2024 at AM    Multiple Vitamin (THERA-TABS) TABS Take 1 tablet by mouth daily. 8/21/2024 at AM    sertraline (ZOLOFT) 100 MG tablet Take 150 mg by mouth at bedtime. 8/27/2024 at PM    traZODone (DESYREL) 50 MG tablet Take  mg by mouth at bedtime. 8/27/2024 at PM    Vitamin D-Vitamin K (D3 + K2 DOTS) 1000-90 UNIT-MCG TABS Take 1 tablet by mouth daily. 8/21/2024 at AM

## 2024-08-28 NOTE — ANESTHESIA PROCEDURE NOTES
"Intrathecal injection Procedure Note    Pre-Procedure   Staff -        Anesthesiologist:  Екатерина Llamas MD       Performed By: anesthesiologist       Location: OR       Procedure Start/Stop Times: 8/28/2024 11:46 AM and 8/28/2024 11:49 AM       Pre-Anesthestic Checklist: patient identified, IV checked, risks and benefits discussed, informed consent, monitors and equipment checked, pre-op evaluation and at physician/surgeon's request  Timeout:       Correct Patient: Yes        Correct Procedure: Yes        Correct Site: Yes        Correct Position: Yes   Procedure Documentation  Procedure: intrathecal injection       Patient Position: sitting       Patient Prep/Sterile Barriers: sterile gloves, mask, patient draped       Skin prep: Chloraprep       Insertion Site: L3-4. (midline approach).       Needle Gauge: 24.        Needle Length (Inches): 4        Spinal Needle Type: Pencan       Introducer used       # of attempts: 1 and  # of redirects:     Assessment/Narrative         Paresthesias: No.       CSF fluid: clear.    Medication(s) Administered   0.75% Hyperbaric Bupivacaine (Intrathecal) - Intrathecal   1.6 mL - 8/28/2024 11:46:00 AM  Medication Administration Time: 8/28/2024 11:46 AM      FOR [facility] ONLY:   Pain Team Contact information: please page the Pain Team Via [email or web address]. Search \"Pain\". During daytime hours, please page the attending first. At night please page the resident first.      "

## 2024-08-28 NOTE — ANESTHESIA CARE TRANSFER NOTE
Patient: Rosraio Walls    Procedure: Procedure(s):  LEFT TOTAL KNEE ARTHROPLASTY       Diagnosis: Osteoarthritis of left knee [M17.12]  Diagnosis Additional Information: No value filed.    Anesthesia Type:   No value filed.     Note:    Oropharynx: oropharynx clear of all foreign objects  Level of Consciousness: awake  Oxygen Supplementation: face mask  Level of Supplemental Oxygen (L/min / FiO2): 4  Independent Airway: airway patency satisfactory and stable  Dentition: dentition unchanged  Vital Signs Stable: post-procedure vital signs reviewed and stable  Report to RN Given: handoff report given  Patient transferred to: PACU    Handoff Report: Identifed the Patient, Identified the Reponsible Provider, Reviewed the pertinent medical history, Discussed the surgical course, Reviewed Intra-OP anesthesia mangement and issues during anesthesia, Set expectations for post-procedure period and Allowed opportunity for questions and acknowledgement of understanding      Vitals:  Vitals Value Taken Time   /73 08/28/24 1352   Temp 36.2  C (97.2  F) 08/28/24 1352   Pulse 72 08/28/24 1352   Resp 14 08/28/24 1352   SpO2 100 % 08/28/24 1352       Electronically Signed By: MALAIKA Gold CRNA  August 28, 2024  1:54 PM

## 2024-08-28 NOTE — ANESTHESIA POSTPROCEDURE EVALUATION
Patient: Rosario Walls    Procedure: Procedure(s):  LEFT TOTAL KNEE ARTHROPLASTY       Anesthesia Type:  No value filed.    Note:  Disposition: Inpatient   Postop Pain Control: Uneventful            Sign Out: Well controlled pain   PONV: No   Neuro/Psych: Uneventful            Sign Out: Acceptable/Baseline neuro status   Airway/Respiratory: Uneventful            Sign Out: Acceptable/Baseline resp. status   CV/Hemodynamics: Uneventful            Sign Out: Acceptable CV status; No obvious hypovolemia; No obvious fluid overload   Other NRE:    DID A NON-ROUTINE EVENT OCCUR? No           Last vitals:  Vitals Value Taken Time   /67 08/28/24 1442   Temp 36.3  C (97.34  F) 08/28/24 1435   Pulse 64 08/28/24 1458   Resp 16 08/28/24 1435   SpO2 100 % 08/28/24 1458   Vitals shown include unfiled device data.    Electronically Signed By: Екатерина Llamas MD  August 28, 2024  2:59 PM

## 2024-08-28 NOTE — ANESTHESIA PROCEDURE NOTES
Airway       Patient location during procedure: OR       Procedure Start/Stop Times: 8/28/2024 12:29 PM and 8/28/2024 12:30 PM  Staff -        CRNA: Nigel Pereyra APRN CRNA       Performed By: CRNA  Consent for Airway        Urgency: elective  Indications and Patient Condition       Indications for airway management: lee-procedural       Induction type:intravenous       Mask difficulty assessment: 1 - vent by mask    Final Airway Details       Final airway type: supraglottic airway    Supraglottic Airway Details        Type: LMA       Brand: Ambu AuraGain       LMA size: 3    Post intubation assessment        Placement verified by: capnometry, equal breath sounds and chest rise        Number of attempts at approach: 1       Number of other approaches attempted: 0       Ease of procedure: easy       Dentition: Intact and Unchanged    Medication(s) Administered   Medication Administration Time: 8/28/2024 12:29 PM

## 2024-08-28 NOTE — OP NOTE
Operative Report    PATIENT Rosario Walls   DATE OF SURGERY:  8/28/2024      PREOPERATIVE DIAGNOSIS   Left degenerative knee osteoarthritis    POSTOPERATIVE DIAGNOSIS   Left degenerative knee osteoarthritis    PROCEDURE PERFORMED   left total knee arthroplasty    IMPLANTS  [place] size 5 cemented triathlon CR femur  Tiffany size 4 primary tibial baseplate  Tiffany size 4 10mm polyethylene insert     SURGEON  Christiano Hurst MD    ASSISTANT   Luz Elena Lo PA-C; assistant was required for patient positioning, surgical assistance, wound closure and monitoring patient's safety throughout the case.    ANESTHESIA  General with Block      FINDINGS:  Tibio and femoral OA, preserved patella cartilage with one central region of grade II wear     SPECIMENS:  none    ESTIMATED BLOOD LOSS:  100 cc    COMPLICATIONS   None.      Tourniquet time:  44 minutes at 250mm Hg          PREOPERATIVE EXAMINATION:    Preoperative right knee examination demonstrated a degenerative varus deformity of the left knee with range of motion from 5-115    Collateral ligament examination demonstrated intact and firm endpoints with fully correctable varus appreciated    Motor examination demonstrated 5 out of 5 strength within the gastrosoleus complex, EHL, and tibialis anterior    Neurologic examination demonstrated intact sensation to light touch within the superficial peroneal, deep peroneal, and tibial nerve distributions    Vascular examination demonstrated a palpable DP, and posterior tibial pulse       INDICATIONS FOR PROCEDURE:  Rosario is a 54 year who has been having symptoms on their left knee substantial enough to interfere with their activities of daily living.  Initial x-rays demonstrated advanced osteoarthritis.  They failed nonoperative treatment measures. The risks, benefits, limitations, alternatives and ramifications of TKA have been discussed in detail.  All complications were discussed pre-operatively including but not limited  to pain, infection, scar, stiffness, blood loss, need for transfusion, neurovascular injury, implant failure, fracture, DVT, PE, MI, CVA, dislocation, loss of limb, loss of life, need for additional surgery.  All of their questions were answered.  They expressed understanding. Consent was obtained and they wished to proceed.     DESCRIPTION OF PROCEDURE    Rosario Walls was brought back to the operating room.  General anesthesia was achieved without difficulty.  The patient was then transferred to the OR table.  All bony prominences were well-padded. The patient was then prepped and draped in the usual sterile fashion.       A timeout was performed prior to the procedure.  Three separate staff members confirmed the patient's name, correct site and side of surgery and procedure being performed.  Antibiotics and TXA were confirmed to be given prior to incision.    The limb was exsanguinated prior to incision.  We began with a standard anterior approach to the knee being certain to obtain adequate hemostasis at each level.  Once we reached the capsule level, a medial parapatellar arthrotomy was performed.  Immediately upon entry into the joint it was apparent that there were grade 4 arthritic changes localized throughout the medial and lateral tibiofemoral joints.  Following evaluation of the joint, we turned our attention to a standard medial release to the mid sagittal line.  The patellar fat pad was removed, and the anterior aspect of the femur adequately excavated to reveal the anterolateral cortex for later sizing.  We first addressed our patella. There was one central region of grade II wear but the remainder had intact cartilage. Therefore we decided to leave this unresurfaced. We next turned our attention to the femur. The ACL was sacrificed at this time, and the entry point for the intramedullary guide aly was drilled according to the patient's preoperative alignment.  Following this the aly was inserted,  and 8mm of distal femur was resected.  The cut thicknesses were confirmed with a caliper on the back table .  At this time we turned our attention to removal of all distal femoral osteophytes.  Following this the femur was sized and the size 5 with confirmed laterality 4-in-1 cutting block was selected and placed. An benny wing was used at this time to confirm no notching and we began with our anterior cut to ensure we had adequate rotation of our component.  Following this the posterior femur was finished followed by the anterior chamfer. We next turned our attention to the tibia.  We used an extramedullary guide to set our rotation, varus and valgus alignment, depth of resection, and slope.  Following this the tibia was resected.  We proceeded to evaluate our extension and flexion gap with a spacer block which measured 9 mm.  Satisfied with our gaps, and ligament tensioning at this time we turned our attention to completion finishing our femur. Our posterior osteophytes were removed. The trial femur was placed. Next, the tibia was exposed, and a size 4 tibial component was selected.  No medial subtraction osteotomy was performed. We confirmed proper rotation as well as A/P and M/L fit. Following this we punched our tibia, and turned our attention next to trialing.  A size 9 polyetyhlene trial was selected which achieved excellent stability in both extension and throughout the flexion arc of motion. We were able to obtain ROM from 0-130  with proper patellar tracking noted. I did feel that the 9mm trial was slightly loose in extension. This was removed and a 10mm was placed. Collateral ligaments demonstrated appropriate tensioning with firm endpoints. Satisfied with our component sizing, and stability our femoral lug holes were drilled and we turned our attention to irrigation, and final preparation prior to cementation.  The knee was copiously lavaged with Betadine followed by normal saline rinse.  Following this  the surfaces were dried and exposed for cement application and component implantation.  Cement was first applied to the tibia with insertion of the tibial component, followed by the 10 mm sized polyethylene insert.  We next placed our femoral component in place and brought the knee into extension for further compression of the cement.  At this time excess cement was removed. At this time we went ahead and injected our local anesthetic (Ropivacaine 300mg, Ketorolac 30mg, Epinephrine 0.6mg in 0.9% NACL to make 100mL) into the periphery of the knee joint while allowing our cement adequate time to cure.  After our cemented cured, the knee was evaluated and excess cement removed. The tourniquet was let down and hemostasis was obtained. We next turned our attention to closure. The joint capsule was closed with interrupted #1 Vicryl sutures in a figure of 8 fashion. This was oversewn with a #1 stratifix. The superficial layers were closed with 0 Vicryl in the deep layers followed by 2-0 monocryl in the deep dermal layer. A 3-0 monocryl was then run in the superficial epidermis followed by Exofin skin glue. A sterile dressing was next placed.  The patient was awakened from anesthesia and taken to the recovery area in stable condition.       POSTOPERATIVE PLAN:  Activity: WBAT left LE  Dressing: Maintain mepilex  DVT Ppx: ASA 81mg BID, plavix to resume POD#1 given history of CAD  Pain: PO pain medication as per protocol  ID: Complete lee-operative AB, 3 days oral AB  Medical issues: Home meds restarted, history of CAD. Will consult medicine if any concerns arise post-operatively  Dispo: General care, follow up in 2 weeks as scheduled       POSTOPERATIVE EXAM:  Resting comfortably in recovery    LLE  Mepilex dressing C/D/I  Fires GSC, EHL, TA  SILT SP, DP, TN  2+ DP    Imaging pending      Christiano Hurst MD  Orthopedic Surgery

## 2024-08-28 NOTE — ANESTHESIA PROCEDURE NOTES
"Adductor canal Procedure Note    Pre-Procedure   Staff -        Anesthesiologist:  Екатерина Llamas MD       Performed By: anesthesiologist       Location: pre-op       Procedure Start/Stop Times: 8/28/2024 11:34 AM and 8/28/2024 11:37 AM       Pre-Anesthestic Checklist: patient identified, IV checked, site marked, risks and benefits discussed, informed consent, monitors and equipment checked, pre-op evaluation, at physician/surgeon's request and post-op pain management  Timeout:       Correct Patient: Yes        Correct Procedure: Yes        Correct Site: Yes        Correct Position: Yes        Correct Laterality: Yes        Site Marked: Yes  Procedure Documentation  Procedure: Adductor canal       Diagnosis: REQUESTED BY SURGEON FOR POSTOP PAIN       Laterality: left       Patient Position: supine       Patient Prep/Sterile Barriers: sterile gloves, mask       Skin prep: Chloraprep       Needle Type: other (echogenic)       Needle Gauge: 20.        Needle Length (Inches): 4        Ultrasound guided       1. Ultrasound was used to identify targeted nerve, plexus, vascular marker, or fascial plane and place a needle adjacent to it in real-time.       2. Ultrasound was used to visualize the spread of anesthetic in close proximity to the above referenced structure.       3. A permanent image is entered into the patient's record.       4. The visualized anatomic structures appeared normal.       5. There were no apparent abnormal pathologic findings.    Assessment/Narrative         The placement was negative for: blood aspirated, painful injection and site bleeding       Paresthesias: No.       Complications: none    Medication(s) Administered   Bupivacaine 0.5% PF (Infiltration) - Infiltration   15 mL - 8/28/2024 11:34:00 AM  Medication Administration Time: 8/28/2024 11:34 AM      FOR [facility] ([facility] [place]) ONLY:   Pain Team Contact information: please page the Pain Team Via [employer]. Search \"Pain\". During daytime " hours, please page the attending first. At night please page the resident first.

## 2024-08-28 NOTE — ANESTHESIA PREPROCEDURE EVALUATION
"Anesthesia Pre-Procedure Evaluation    Patient: Rosario Walls   MRN: [id number] : 1970        Procedure : Procedure(s):  LEFT TOTAL KNEE ARTHROPLASTY          Past Medical History:   Diagnosis Date    Antiplatelet or antithrombotic long-term use     Arthritis     Coronary artery disease     Heart attack (H)     Hypertension     Stented coronary artery       History reviewed. No pertinent surgical history.   Allergies   Allergen Reactions    Codeine       Social History     Tobacco Use    Smoking status: Never    Smokeless tobacco: Never   Substance Use Topics    Alcohol use: Yes     Comment: 2-3 per week      Wt Readings from Last 1 Encounters:   24 74.8 kg (165 lb)        Anesthesia Evaluation   Pt has had prior anesthetic.     No history of anesthetic complications       ROS/MED HX  ENT/Pulmonary:  - neg pulmonary ROS     Neurologic:  - neg neurologic ROS     Cardiovascular:     (+) Dyslipidemia hypertension- -  CAD - past MI - stent-                                      METS/Exercise Tolerance:     Hematologic:  - neg hematologic  ROS     Musculoskeletal:       GI/Hepatic:  - neg GI/hepatic ROS     Renal/Genitourinary:  - neg Renal ROS     Endo:  - neg endo ROS     Psychiatric/Substance Use:     (+) psychiatric history depression       Infectious Disease:  - neg infectious disease ROS     Malignancy:       Other:            Physical Exam    Airway        Mallampati: II   TM distance: > 3 FB   Neck ROM: full   Mouth opening: > 3 cm    Respiratory Devices and Support         Dental     Comment: Good condition        Cardiovascular          Rhythm and rate: regular and normal     Pulmonary           breath sounds clear to auscultation           OUTSIDE LABS:  CBC: No results found for: \"WBC\", \"HGB\", \"HCT\", \"PLT\"  BMP:   Lab Results   Component Value Date    GLC 96 2024     COAGS: No results found for: \"PTT\", \"INR\", \"FIBR\"  POC: No results found for: \"BGM\", \"HCG\", \"HCGS\"  HEPATIC: No results " "found for: \"ALBUMIN\", \"PROTTOTAL\", \"ALT\", \"AST\", \"GGT\", \"ALKPHOS\", \"BILITOTAL\", \"BILIDIRECT\", \"MARCUS\"  OTHER: No results found for: \"PH\", \"LACT\", \"A1C\", \"SABINA\", \"PHOS\", \"MAG\", \"LIPASE\", \"AMYLASE\", \"TSH\", \"T4\", \"T3\", \"CRP\", \"SED\"    Anesthesia Plan    ASA Status:  3       Anesthesia Type: Spinal.              Consents    Anesthesia Plan(s) and associated risks, benefits, and realistic alternatives discussed. Questions answered and patient/representative(s) expressed understanding.     - Discussed: Risks, Benefits and Alternatives for BOTH SEDATION and the PROCEDURE were discussed     - Discussed with:  Patient            Postoperative Care    Pain management: Peripheral nerve block (Single Shot).        Comments:    Other Comments: Consented for AC block for postop pain control.           Екатерина Llamas MD    I have reviewed the pertinent notes and labs in the chart from the past 30 days and (re)examined the patient.  Any updates or changes from those notes are reflected in this note.             # Drug Induced Platelet Defect: home medication list includes an antiplatelet medication      "

## 2024-08-28 NOTE — PLAN OF CARE
Patient was not seen today.  Med rec was already completed by primary team.  Holding parameters will be added to some of the medications.  Official hospital medicine consult to follow on 8/29.

## 2024-08-29 ENCOUNTER — APPOINTMENT (OUTPATIENT)
Dept: OCCUPATIONAL THERAPY | Facility: CLINIC | Age: 54
End: 2024-08-29
Attending: STUDENT IN AN ORGANIZED HEALTH CARE EDUCATION/TRAINING PROGRAM
Payer: COMMERCIAL

## 2024-08-29 ENCOUNTER — APPOINTMENT (OUTPATIENT)
Dept: PHYSICAL THERAPY | Facility: CLINIC | Age: 54
End: 2024-08-29
Attending: STUDENT IN AN ORGANIZED HEALTH CARE EDUCATION/TRAINING PROGRAM
Payer: COMMERCIAL

## 2024-08-29 LAB
FASTING STATUS PATIENT QL REPORTED: YES
GLUCOSE SERPL-MCNC: 126 MG/DL (ref 70–99)
HGB BLD-MCNC: 10.7 G/DL (ref 11.7–15.7)

## 2024-08-29 PROCEDURE — 82947 ASSAY GLUCOSE BLOOD QUANT: CPT | Performed by: STUDENT IN AN ORGANIZED HEALTH CARE EDUCATION/TRAINING PROGRAM

## 2024-08-29 PROCEDURE — 258N000003 HC RX IP 258 OP 636: Performed by: STUDENT IN AN ORGANIZED HEALTH CARE EDUCATION/TRAINING PROGRAM

## 2024-08-29 PROCEDURE — 99244 OFF/OP CNSLTJ NEW/EST MOD 40: CPT | Performed by: STUDENT IN AN ORGANIZED HEALTH CARE EDUCATION/TRAINING PROGRAM

## 2024-08-29 PROCEDURE — 250N000013 HC RX MED GY IP 250 OP 250 PS 637: Performed by: STUDENT IN AN ORGANIZED HEALTH CARE EDUCATION/TRAINING PROGRAM

## 2024-08-29 PROCEDURE — 85018 HEMOGLOBIN: CPT | Performed by: STUDENT IN AN ORGANIZED HEALTH CARE EDUCATION/TRAINING PROGRAM

## 2024-08-29 PROCEDURE — 97116 GAIT TRAINING THERAPY: CPT | Mod: GP

## 2024-08-29 PROCEDURE — 97110 THERAPEUTIC EXERCISES: CPT | Mod: GP

## 2024-08-29 PROCEDURE — 97161 PT EVAL LOW COMPLEX 20 MIN: CPT | Mod: GP

## 2024-08-29 PROCEDURE — 250N000011 HC RX IP 250 OP 636: Performed by: STUDENT IN AN ORGANIZED HEALTH CARE EDUCATION/TRAINING PROGRAM

## 2024-08-29 PROCEDURE — 97166 OT EVAL MOD COMPLEX 45 MIN: CPT | Mod: GO

## 2024-08-29 PROCEDURE — 36415 COLL VENOUS BLD VENIPUNCTURE: CPT | Performed by: STUDENT IN AN ORGANIZED HEALTH CARE EDUCATION/TRAINING PROGRAM

## 2024-08-29 PROCEDURE — 97535 SELF CARE MNGMENT TRAINING: CPT | Mod: GO

## 2024-08-29 RX ADMIN — CLOPIDOGREL BISULFATE 75 MG: 75 TABLET ORAL at 08:29

## 2024-08-29 RX ADMIN — SERTRALINE 150 MG: 100 TABLET, FILM COATED ORAL at 21:01

## 2024-08-29 RX ADMIN — BUPROPION HYDROCHLORIDE 150 MG: 150 TABLET, EXTENDED RELEASE ORAL at 09:44

## 2024-08-29 RX ADMIN — CEPHALEXIN 500 MG: 500 CAPSULE ORAL at 05:02

## 2024-08-29 RX ADMIN — OXYCODONE HYDROCHLORIDE 10 MG: 5 TABLET ORAL at 01:30

## 2024-08-29 RX ADMIN — CEFAZOLIN 1 G: 1 INJECTION, POWDER, FOR SOLUTION INTRAMUSCULAR; INTRAVENOUS at 05:02

## 2024-08-29 RX ADMIN — OXYCODONE HYDROCHLORIDE 10 MG: 5 TABLET ORAL at 19:00

## 2024-08-29 RX ADMIN — POLYETHYLENE GLYCOL 3350 17 G: 17 POWDER, FOR SOLUTION ORAL at 08:29

## 2024-08-29 RX ADMIN — SODIUM CHLORIDE, POTASSIUM CHLORIDE, SODIUM LACTATE AND CALCIUM CHLORIDE 1000 ML: 600; 310; 30; 20 INJECTION, SOLUTION INTRAVENOUS at 08:26

## 2024-08-29 RX ADMIN — ACETAMINOPHEN 975 MG: 325 TABLET ORAL at 13:34

## 2024-08-29 RX ADMIN — SENNOSIDES AND DOCUSATE SODIUM 1 TABLET: 50; 8.6 TABLET ORAL at 19:01

## 2024-08-29 RX ADMIN — OXYCODONE HYDROCHLORIDE 10 MG: 5 TABLET ORAL at 23:29

## 2024-08-29 RX ADMIN — CEPHALEXIN 500 MG: 500 CAPSULE ORAL at 11:26

## 2024-08-29 RX ADMIN — CEPHALEXIN 500 MG: 500 CAPSULE ORAL at 23:29

## 2024-08-29 RX ADMIN — SODIUM CHLORIDE, POTASSIUM CHLORIDE, SODIUM LACTATE AND CALCIUM CHLORIDE: 600; 310; 30; 20 INJECTION, SOLUTION INTRAVENOUS at 08:07

## 2024-08-29 RX ADMIN — ACETAMINOPHEN 975 MG: 325 TABLET ORAL at 20:58

## 2024-08-29 RX ADMIN — HYDROMORPHONE HYDROCHLORIDE 0.4 MG: 0.2 INJECTION, SOLUTION INTRAMUSCULAR; INTRAVENOUS; SUBCUTANEOUS at 20:55

## 2024-08-29 RX ADMIN — OXYCODONE HYDROCHLORIDE 5 MG: 5 TABLET ORAL at 11:53

## 2024-08-29 RX ADMIN — CEPHALEXIN 500 MG: 500 CAPSULE ORAL at 17:56

## 2024-08-29 RX ADMIN — OXYCODONE HYDROCHLORIDE 5 MG: 5 TABLET ORAL at 11:26

## 2024-08-29 RX ADMIN — ATORVASTATIN CALCIUM 10 MG: 10 TABLET, FILM COATED ORAL at 21:01

## 2024-08-29 RX ADMIN — Medication 25 MCG: at 08:29

## 2024-08-29 RX ADMIN — THERA TABS 1 TABLET: TAB at 08:29

## 2024-08-29 RX ADMIN — ASPIRIN 81 MG: 81 TABLET, COATED ORAL at 08:29

## 2024-08-29 RX ADMIN — ASPIRIN 81 MG: 81 TABLET, COATED ORAL at 19:00

## 2024-08-29 RX ADMIN — OXYCODONE HYDROCHLORIDE 5 MG: 5 TABLET ORAL at 07:06

## 2024-08-29 RX ADMIN — ACETAMINOPHEN 975 MG: 325 TABLET ORAL at 05:02

## 2024-08-29 RX ADMIN — SENNOSIDES AND DOCUSATE SODIUM 1 TABLET: 50; 8.6 TABLET ORAL at 08:29

## 2024-08-29 ASSESSMENT — ACTIVITIES OF DAILY LIVING (ADL)
ADLS_ACUITY_SCORE: 23
ADLS_ACUITY_SCORE: 23
ADLS_ACUITY_SCORE: 28
ADLS_ACUITY_SCORE: 23
ADLS_ACUITY_SCORE: 28
ADLS_ACUITY_SCORE: 28
ADLS_ACUITY_SCORE: 23
ADLS_ACUITY_SCORE: 23
ADLS_ACUITY_SCORE: 28
ADLS_ACUITY_SCORE: 28
ADLS_ACUITY_SCORE: 23
ADLS_ACUITY_SCORE: 28
ADLS_ACUITY_SCORE: 23
ADLS_ACUITY_SCORE: 23
ADLS_ACUITY_SCORE: 28
ADLS_ACUITY_SCORE: 28
ADLS_ACUITY_SCORE: 23
ADLS_ACUITY_SCORE: 28
ADLS_ACUITY_SCORE: 27

## 2024-08-29 ASSESSMENT — CHADS2 SCORE: AGE GREATER THAN OR EQUAL TO 75: NO

## 2024-08-29 NOTE — PLAN OF CARE
Goal Outcome Evaluation:    Patient vital signs are at baseline: Yes  Patient able to ambulate as they were prior to admission or with assist devices provided by therapies during their stay:  Yes  Patient MUST void prior to discharge:  No,  Reason:  unable to void, straight cathed x1 overnight  Patient able to tolerate oral intake:  Yes  Pain has adequate pain control using Oral analgesics:  No,  Reason:  required IV dilaudid x1  Does patient have an identified :  Yes  Has goal D/C date and time been discussed with patient:  Yes    Patient VSS, however became lightheaded on the toilet trying to void. BP 86/51, assisted back to bed, BP improved to baseline.     Ambulated Ax1 to bathroom. Denies further feelings of being lightheaded. Unable to void. Dressing CDI. IV dilaudid given x1, otherwise managing with oxy. Plan to work with PT/OT in AM.

## 2024-08-29 NOTE — PROGRESS NOTES
Patient vital signs are at baseline: Yes - Orthostatic hypotension resolved after LR 1,000 bolus.   Patient able to ambulate as they were prior to admission or with assist devices provided by therapies during their stay:  Yes - She has passed PT. Some dizziness when she tried to move to fast. When she slowed down and took her time this was better.   Patient MUST void prior to discharge:  Yes - She is voiding appropriate amounts but still retaining >400. No st cath yet, due to frequent urination and asymptomatic. Goal bladder scan less then 300.   Patient able to tolerate oral intake:  Yes  Pain has adequate pain control using Oral analgesics:  Yes  Does patient have an identified :  Yes  Has goal D/C date and time been discussed with patient:  Yes - Discharge pending improvement in urinary retention.      Baldemar Pro RN, ONC

## 2024-08-29 NOTE — PROGRESS NOTES
08/29/24 0945   Appointment Info   Signing Clinician's Name / Credentials (OT) BRENNON BarR/L, CLT   Rehab Comments (OT) OT srinivasan   Quick Adds   Quick Adds Certification   Living Environment   People in Home spouse   Self-Care   Usual Activity Tolerance good   Current Activity Tolerance moderate   Equipment Currently Used at Home raised toilet seat  (grab bar and vanity near toilet (has bilateral items to hold near toilet). Extended tub bench for tub.)   Activity/Exercise/Self-Care Comment Pt independent w/ ADL and IADL at baseline.   General Information   Onset of Illness/Injury or Date of Surgery 08/28/24   Referring Physician Dr. Hrust   Patient/Family Therapy Goal Statement (OT) wants to go home   Additional Occupational Profile Info/Pertinent History of Current Problem TKA   Existing Precautions/Restrictions weight bearing   Left Lower Extremity (Weight-bearing Status) weight-bearing as tolerated (WBAT)   Cognitive Status Examination   Orientation Status orientation to person, place and time   Affect/Mental Status (Cognitive) WNL   Visual Perception   Visual Impairment/Limitations WFL   Sensory   Sensory Quick Adds sensation intact   Posture   Posture not impaired   Range of Motion Comprehensive   General Range of Motion no range of motion deficits identified   Strength Comprehensive (MMT)   General Manual Muscle Testing (MMT) Assessment no strength deficits identified   Muscle Tone Assessment   Muscle Tone Quick Adds No deficits were identified   Coordination   Upper Extremity Coordination No deficits were identified   Bed Mobility   Bed Mobility supine-sit;sit-supine   Comment (Bed Mobility) SBA-CGA   Transfers   Transfers bed-chair transfer;sit-stand transfer;toilet transfer;shower transfer   Transfer Comments SBA-CGA   Sit-Stand Transfer   Sit/Stand Transfer Comments SBA-CGA   Shower Transfer   Shower Transfer Comments SBA-CGA per clinical judgement   Toilet Transfer   Toilet Transfer Comments  SBA-CGA   Balance   Balance Comments decreased   Clinical Impression   Criteria for Skilled Therapeutic Interventions Met (OT) Yes, treatment indicated   OT Diagnosis decreased ADLs   Influenced by the following impairments TKA   OT Problem List-Impairments impacting ADL activity tolerance impaired;pain;mobility   Assessment of Occupational Performance 3-5 Performance Deficits   Identified Performance Deficits dressing, bathing, functional mobility, toileting   Planned Therapy Interventions (OT) ADL retraining;bed mobility training;transfer training   Clinical Decision Making Complexity (OT) detailed assessment/moderate complexity   Risk & Benefits of therapy have been explained evaluation/treatment results reviewed;patient   OT Total Evaluation Time   OT Eval, Moderate Complexity Minutes (61169) 10   Therapy Certification   Medical Diagnosis TKA   Start of Care Date 08/29/24   Certification date from 08/29/24   Certification date to 09/28/24   OT Goals   Therapy Frequency (OT) One time eval and treatment   OT Predicted Duration/Target Date for Goal Attainment 08/29/24   OT Goals Lower Body Dressing;Toilet Transfer/Toileting   OT: Lower Body Dressing Modified independent;Goal Met;Completed   OT: Toilet Transfer/Toileting Modified independent;toilet transfer;within precautions;Goal Met;Completed   Interventions   Interventions Quick Adds Self-Care/Home Management   Self-Care/Home Management   Self-Care/Home Mgmt/ADL, Compensatory, Meal Prep Minutes (84682) 25   Symptoms Noted During/After Treatment (Meal Preparation/Planning Training) none   Treatment Detail/Skilled Intervention Pt edu on compensatory strategies for LE dressing - pt completed Mod I after instruction. STS w/ SBA and amb. ~15 ft to BR w/ FWW, pain w/ mobility. Pt edu on safety technique for toilet/tub-shower transfer - completed toilet tx Mod I. Pt instructed on bed mobility techniques - completed Mod I. Pt edu on sleeping position and car transfers  and FWW options - pt and spouse verbalized understanding.   OT Discharge Planning   OT Plan OT d/c   OT Discharge Recommendation (DC Rec) other (see comments)  (defer to ortho)   OT Rationale for DC Rec pt moving well and has good support   OT Brief overview of current status mod I for BADL   OT Equipment Needed at Discharge other (see comments)  (none)   Total Session Time   Timed Code Treatment Minutes 25   Total Session Time (sum of timed and untimed services) 35    [facility]  OUTPATIENT OCCUPATIONAL THERAPY  EVALUATION  PLAN OF TREATMENT FOR OUTPATIENT REHABILITATION  (COMPLETE FOR INITIAL CLAIMS ONLY)  Patient's Last Name, First Name, M.I.  YOB: 1970  Rosario Walls                          Provider's Name  [facility] Medical Record No.  [id number]                             Onset Date:  08/28/24   Start of Care Date:  08/29/24   Type:     ___PT   _X_OT   ___SLP Medical Diagnosis:  TKA                    OT Diagnosis:  decreased ADLs Visits from SOC:  1     See note for plan of treatment, functional goals and certification details    I CERTIFY THE NEED FOR THESE SERVICES FURNISHED UNDER        THIS PLAN OF TREATMENT AND WHILE UNDER MY CARE     (Physician co-signature of this document indicates review and certification of the therapy plan).

## 2024-08-29 NOTE — PLAN OF CARE
Patient vital signs are at baseline: Yes  Patient able to ambulate as they were prior to admission or with assist devices provided by therapies during their stay:  Yes  Patient MUST void prior to discharge:  No,  Reason:  Due to void  Patient able to tolerate oral intake:  Yes  Pain has adequate pain control using Oral analgesics:  Yes  Does patient have an identified :  Yes  Has goal D/C date and time been discussed with patient:  Yes    Patient alert and oriented. VSS. RA. LR infusing at 75ml/hr. Tolerating Regular diet. PRN oxycodone given for pain along with repositioning and ice packs. Patient due to void. Ambulated in room, A1 with walker and gait belt. Call light within reach. Calls appropriately. Alarms on.

## 2024-08-29 NOTE — PROGRESS NOTES
08/29/24 1130   Appointment Info   Signing Clinician's Name / Credentials (PT) Liam Daniel PT   Living Environment   People in Home spouse   Current Living Arrangements house   Home Accessibility stairs within home;stairs to enter home   Number of Stairs, Main Entrance 2   Stair Railings, Main Entrance none   Number of Stairs, Within Home, Primary seven   Stair Railings, Within Home, Primary railings safe and in good condition;railing on left side (ascending)   Transportation Anticipated family or friend will provide   Self-Care   Usual Activity Tolerance good   Current Activity Tolerance moderate   Activity/Exercise/Self-Care Comment Pt independent with mobility prior to admit.   General Information   Onset of Illness/Injury or Date of Surgery 08/28/24   Referring Physician Christiano Hurst MD   Patient/Family Therapy Goals Statement (PT) Get back home safely.   Pertinent History of Current Problem (include personal factors and/or comorbidities that impact the POC) s/p L TKA   Existing Precautions/Restrictions weight bearing   Weight-Bearing Status - LLE weight-bearing as tolerated   Weight-Bearing Status - RLE full weight-bearing   Range of Motion (ROM)   ROM Comment decreased ROM s/p L TKA   Clinical Impression   Criteria for Skilled Therapeutic Intervention Yes, treatment indicated   PT Diagnosis (PT) impaired functional mobility   Influenced by the following impairments pain, decreased strength   Functional limitations due to impairments gait, stairs, transfers   Clinical Presentation (PT Evaluation Complexity) stable   Clinical Presentation Rationale pt presents as medically diagnosed   Clinical Decision Making (Complexity) low complexity   Planned Therapy Interventions (PT) bed mobility training;gait training;home exercise program;patient/family education;stair training;strengthening;transfer training;ROM (range of motion)   Risk & Benefits of therapy have been explained care plan/treatment goals  reviewed;patient   Physical Therapy Goals   PT Frequency One time eval and treatment only   PT Predicted Duration/Target Date for Goal Attainment 08/29/24   PT Goals PT Goal 1;Gait;Stairs;Transfers   PT: Transfers Supervision/stand-by assist;Sit to/from stand;Assistive device   PT: Gait Rolling walker;50 feet;Supervision/stand-by assist;Goal Met   PT: Stairs Supervision/stand-by assist;4 stairs;Assistive device;Rail on left;Goal Met   PT: Goal 1 Independent with written HEP for LE strengthening and ROM.  (Goal Met)   Interventions   Interventions Quick Adds Therapeutic Procedure;Therapeutic Activity;Gait Training   Therapeutic Procedure/Exercise   Ther. Procedure: strength, endurance, ROM, flexibillity Minutes (08747) 10   Treatment Detail/Skilled Intervention Supine therex 10 x reps: APs, QS, GS. TKA protocol 5x reps with L LE, verbal cueing for technique, CGA: heelslides, SAQ. Verbal review of remaining exercises d/t pain. Edu provided to spouse on guiding exercises with assistance as needed. Pt and spouse verbalize understanding.   Gait Training   Gait Training Minutes (60417) 20   Symptoms Noted During/After Treatment (Gait Training) dizziness;fatigue   Treatment Detail/Skilled Intervention Pt amb in hallway using FWW, CGA. Edu on gait sequence using FWW. Pt c/o dizziness requiring rest break, RN brings bedside chair. Extra time needed for rest. Pt stand to sit in bedside chair, CGA, VC for hand placement. Pt recovers to baseline and agreeable to stair training. Pt taken to PT gym in bedside chair. Pt BP prior to stair training 121/60. Pt encouraged to pace activity. Demo of sequence using SPC. Pt sit to stand to FWW, CGA. Pt transitions to SPC and handrail, completing 4 steps, CGA to SBA. Seated rest break. Edu on guarding provided to spouse. Pt STS from bedside chair, SBA, completes 4 steps using L handrail and SPC on R. Pt stand to sit in bedside chair, SBA. Pt encouraged to walk with nursing and spouse as  able to progress distance. Pt returned to room in bedside chair. RN notified of performance, recs, and position.   Distance in Feet 85   [place] Level (Gait Training) stand-by assist   Physical Assistance Level (Gait Training) verbal cues;supervision   Weight Bearing (Gait Training) weight-bearing as tolerated   Assistive Device (Gait Training) rolling walker   Pattern Analysis (Gait Training) swing-to gait   Gait Analysis Deviations decreased sergio   Impairments (Gait Analysis/Training) pain;ROM decreased;strength decreased   Stair Railings present on left side   Physical Assist/Nonphysical Assist (Stairs) supervision;verbal cues   Level of [place] (Stairs) stand-by assist   Assistive Device (Stairs) straight cane   PT Discharge Planning   PT Plan D/C PT   PT Discharge Recommendation (DC Rec) other (see comments)   PT Rationale for DC Rec defer to ortho   PT Brief overview of current status Gait: 85 ft, CGA using FWW; Transfers: STS with SBA; Stairs: 4 stairs with SPC and L handrail, CGA to SBA.   PT Equipment Needed at Discharge walker, rolling  (FWW)   Total Session Time   Timed Code Treatment Minutes 30   Total Session Time (sum of timed and untimed services) 30

## 2024-08-29 NOTE — PROGRESS NOTES
"Orthopedic Progress Note      Assessment: 1 Day Post-Op  S/P Procedure(s):  LEFT TOTAL KNEE ARTHROPLASTY     Plan:   - Continue PT/OT.   - Weightbearing status: WBAT.  - Anticoagulation: ASA 81 mg BID, Plavix in addition to SCDs, diana stockings and early ambulation.  - Discharge planning: Discharge to home when passing therapies and medically cleared     Subjective:  Pain: Well controlled, 1/10  Nausea, Vomiting:  No  Chest pain: No  Lightheadedness, Dizziness:  No  Neuro:  Patient denies new onset numbness or paresthesias in her lower extremity     Patient is doing well on POD #1. Experiencing low blood pressure, getting bolus now. Has not voided yet. Tolerating oral intake & pain is controlled with oral medication. Hgb 10.7.     Objective:  BP 98/55 (BP Location: Left arm)   Pulse 65   Temp 97.9  F (36.6  C) (Oral)   Resp 16   Ht 1.753 m (5' 9\")   Wt 74.8 kg (165 lb)   SpO2 97%   BMI 24.37 kg/m    The patient is A&Ox3. Sleepy, just received pain meds. .  Sensation is intact to light touch & equal bilaterally in the L2 through S1 dermatomes.  Calves are soft and non-tender.  Dorsiflexion and plantar flexion is intact bilaterally.  Appropriate flexion and extension of the toes bilaterally.   Brisk capillary refill in the toes bilaterally.   Palpable bilateral  dorsalis pedis pulse.  left knee dressing C/D/I.       Pertinent Labs   Lab Results: personally reviewed.   No results found for: \"INR\", \"PROTIME\"  Lab Results   Component Value Date    HGB 10.7 (L) 08/29/2024     No results found for: \"NA\", \"CO2\"      Report completed by:  Narda Chaudhry PA-C/Dr[facility]    Date: 8/29/2024  Time: 9:02 AM    "

## 2024-08-29 NOTE — CONSULTS
[facility]  Consult Note - Hospitalist Service  Date of Admission:  8/28/2024  Consult Requested by: Orthopedic surgery Dr. Hurst  Reason for Consult: Management of her chronic medical conditions    Assessment & Plan   Rosario Walls is a 54 year old female admitted on 8/28/2024. She has a past medical history significant for hyperlipidemia, hypertension, coronary artery disease status post 3 stents in 2012 complicated with LAD dissection and distal occlusion who presented to the hospital on 8/28 for elective left knee arthroplasty.  Hospitalist medicine consulted for management of chronic medical conditions.  Patient did not pass therapy today.  Possible discharge on 8/30 if blood pressure remains stable and no urinary retention.      Hypertension  At home on carvedilol  Blood pressure on 8/29 borderline hypotension.    Plan  Gave 1 L of IV fluid bolus this morning.  Continue Coreg with holding parameters.    Hyperlipidemia    Plan  Continue home atorvastatin    Urinary retention  Status post straight catheterization overnight.  Per nursing staff, improvement of urinary output and decrease in PVR.    Plan  Continue to monitor PVR.  If signs of urinary retention, repeat straight catheterization.  If straight cath x 2 and patient is retaining again, proceed with Ernandez catheter.    Depression    Plan  Continue home sertraline and peripheral    History of coronary artery disease status post 3 stents in 2012  Giving location of stents and complications with LAD dissection/distal thrombus.  Patient remains on dual antiplatelet therapy.  No chest pain or shortness of breath    Plan  Continue dual antiplatelet therapy  Hold home Imdur for tonight given borderline hypotension.    Status post left knee arthroplasty    Plan  Pain management and DVT prophylaxis as per primary team            Clinically Significant Risk Factors Present on Admission                # Drug Induced Platelet Defect: home  medication list includes an antiplatelet medication      # Anemia: based on hgb <11                        DAMIAN RAY MD  Hospitalist Service  Securely message with [employer] (more info)  Text page via [facility] Paging/Directory   ______________________________________________________________________      History of Present Illness   Rosario Walls is a 54 year old female admitted on 8/28/2024. She has a past medical history significant for hyperlipidemia, hypertension, coronary artery disease status post 3 stents in 2012 complicated with LAD dissection and distal occlusion who presented to the hospital on 8/28 for elective left knee arthroplasty.     Evaluated around 9:30 AM.  Patient is feeling well.  She denies any chest pain or shortness of breath.  She denies any lightheadedness.  She denies any nausea or vomiting.      Past Medical History    Past Medical History:   Diagnosis Date    Antiplatelet or antithrombotic long-term use     Arthritis     Coronary artery disease     Heart attack (H)     Hypertension     Stented coronary artery        Past Surgical History   Past Surgical History:   Procedure Laterality Date    ARTHROPLASTY KNEE Left 8/28/2024    Procedure: LEFT TOTAL KNEE ARTHROPLASTY;  Surgeon: Christiano Hurst MD;  Location: [facility] Main OR       Medications   I have reviewed this patient's current medications  Current Facility-Administered Medications   Medication Dose Route Frequency Provider Last Rate Last Admin    [START ON 8/31/2024] acetaminophen (TYLENOL) tablet 650 mg  650 mg Oral Q4H PRN Christiano Hurst MD        acetaminophen (TYLENOL) tablet 975 mg  975 mg Oral Q8H Christiano Hurst MD   975 mg at 08/29/24 1334    aspirin EC tablet 81 mg  81 mg Oral BID Christiano Hurst MD   81 mg at 08/29/24 0829    atorvastatin (LIPITOR) tablet 10 mg  10 mg Oral At Bedtime Christiano Hurst MD   10 mg at 08/28/24 2134    benzocaine-menthol (CEPACOL) 15-3.6 MG lozenge 1 lozenge  1 lozenge Buccal Q1H PRN Aris  MD Christiano        [START ON 8/31/2024] bisacodyl (DULCOLAX) suppository 10 mg  10 mg Rectal Daily PRN Christiano Hurst MD        buPROPion (WELLBUTRIN XL) 24 hr tablet 150 mg  150 mg Oral QAM Christiano Hurst MD   150 mg at 08/29/24 0944    carvedilol (COREG) half-tab 1.562 mg  1.562 mg Oral BID w/meals Sal Yoo MD        ceFAZolin Sodium (ANCEF) injection 2 g  2 g Intravenous See Admin Instructions Megan Lo PA-C        cephALEXin (KEFLEX) capsule 500 mg  500 mg Oral Q6H ISMA Christiano Hurst MD   500 mg at 08/29/24 1126    clopidogrel (PLAVIX) tablet 75 mg  75 mg Oral Daily Christiano Hurst MD   75 mg at 08/29/24 0829    fentaNYL (PF) (SUBLIMAZE) injection  mcg   mcg Intravenous q1 min prn Екатерина Llamas MD   100 mcg at 08/28/24 1134    HYDROmorphone (DILAUDID) injection 0.2 mg  0.2 mg Intravenous Q2H PRN Christiano Hurst MD        Or    HYDROmorphone (DILAUDID) injection 0.4 mg  0.4 mg Intravenous Q2H PRN Christiano Hurst MD   0.4 mg at 08/28/24 2328    [Held by provider] isosorbide mononitrate (IMDUR) 24 hr tablet 30 mg  30 mg Oral Daily Sal Yoo MD        lactated ringers infusion   Intravenous Continuous Christiano Hurst MD 75 mL/hr at 08/29/24 0807 New Bag at 08/29/24 0807    lidocaine (LMX4) cream   Topical Q1H PRN Екатерина Llamas MD        lidocaine (LMX4) cream   Topical Q1H PRN Christiano Hurst MD        lidocaine 1 % 0.1-1 mL  0.1-1 mL Other Q1H PRN Екатерина Llamas MD        lidocaine 1 % 0.1-1 mL  0.1-1 mL Other Q1H PRN Christiano Hurst MD        [START ON 8/30/2024] magnesium hydroxide (MILK OF MAGNESIA) suspension 30 mL  30 mL Oral Daily PRN Christiano Hurst MD        multivitamin, therapeutic (THERA-VIT) tablet 1 tablet  1 tablet Oral Daily Christiano Hurst MD   1 tablet at 08/29/24 0829    ondansetron (ZOFRAN ODT) ODT tab 4 mg  4 mg Oral Q6H PRN Chrisitano Hurst MD        Or    ondansetron (ZOFRAN) injection 4 mg  4 mg Intravenous Q6H PRN Christiano Hurst MD        oxyCODONE  (ROXICODONE) tablet 5 mg  5 mg Oral Q4H PRN Christiano Hurst MD   5 mg at 08/29/24 1153    Or    oxyCODONE (ROXICODONE) tablet 10 mg  10 mg Oral Q4H PRN Christiano Hurst MD   10 mg at 08/29/24 0130    polyethylene glycol (MIRALAX) Packet 17 g  17 g Oral Daily Christiano Hurst MD   17 g at 08/29/24 0829    prochlorperazine (COMPAZINE) injection 10 mg  10 mg Intravenous Q6H PRN Christiano Hurst MD        Or    prochlorperazine (COMPAZINE) tablet 10 mg  10 mg Oral Q6H PRN Christiano Hurst MD        senna-docusate (SENOKOT-S/PERICOLACE) 8.6-50 MG per tablet 1 tablet  1 tablet Oral BID Christiano Hurst MD   1 tablet at 08/29/24 0829    sertraline (ZOLOFT) tablet 150 mg  150 mg Oral At Bedtime Christiano Hurst MD   150 mg at 08/28/24 2134    sodium chloride (PF) 0.9% PF flush 3 mL  3 mL Intracatheter Q8H Екатерина Llamas MD   3 mL at 08/29/24 0945    sodium chloride (PF) 0.9% PF flush 3 mL  3 mL Intracatheter q1 min prn Екатерина Llamas MD        sodium chloride (PF) 0.9% PF flush 3 mL  3 mL Intracatheter Q8H Christiano Hurst MD        sodium chloride (PF) 0.9% PF flush 3 mL  3 mL Intracatheter q1 min prn Christiano Hurst MD        [Held by provider] traZODone (DESYREL) tablet  mg   mg Oral At Bedtime Christiano Hurst MD   50 mg at 08/28/24 2134    Vitamin D3 (CHOLECALCIFEROL) tablet 25 mcg  25 mcg Oral Daily Christiano Hurst MD   25 mcg at 08/29/24 0829            Physical Exam   Vital Signs: Temp: 98.6  F (37  C) Temp src: Oral BP: 116/63 Pulse: 78   Resp: 16 SpO2: 97 % O2 Device: None (Room air) Oxygen Delivery: 2 LPM  Weight: 165 lbs 0 oz    Physical Exam  Constitutional:       General: She is not in acute distress.     Appearance: She is not ill-appearing or toxic-appearing.   Pulmonary:      Effort: Pulmonary effort is normal. No respiratory distress.      Breath sounds: No wheezing.   Skin:     General: Skin is warm and dry.   Neurological:      Mental Status: She is alert.   Psychiatric:         Mood and Affect: Mood  normal.         Thought Content: Thought content normal.          Medical Decision Making       48 MINUTES SPENT BY ME on the date of service doing chart review, history, exam, documentation & further activities per the note.      Data     I have personally reviewed the following data over the past 24 hrs:    N/A  \   10.7 (L)   / N/A     N/A N/A N/A /  126 (H)   N/A N/A N/A \       Imaging results reviewed over the past 24 hrs:   No results found for this or any previous visit (from the past 24 hour(s)).

## 2024-08-29 NOTE — PROGRESS NOTES
08/29/24 1130   Appointment Info   Signing Clinician's Name / Credentials (PT) Liam Daniel PT   Living Environment   People in Home spouse   Current Living Arrangements house   Home Accessibility stairs within home;stairs to enter home   Number of Stairs, Main Entrance 2   Stair Railings, Main Entrance none   Number of Stairs, Within Home, Primary seven   Stair Railings, Within Home, Primary railings safe and in good condition;railing on left side (ascending)   Transportation Anticipated family or friend will provide   Self-Care   Usual Activity Tolerance good   Current Activity Tolerance moderate   Regular Exercise Other (see comments)  (yard work)   Equipment Currently Used at Home none   Fall history within last six months no   Activity/Exercise/Self-Care Comment Pt independent with mobility prior to admit.   General Information   Onset of Illness/Injury or Date of Surgery 08/28/24   Referring Physician Christiano Hurst MD   Patient/Family Therapy Goals Statement (PT) Get back home safely.   Pertinent History of Current Problem (include personal factors and/or comorbidities that impact the POC) s/p L TKA   Existing Precautions/Restrictions weight bearing   Weight-Bearing Status - LLE weight-bearing as tolerated   Weight-Bearing Status - RLE full weight-bearing   Range of Motion (ROM)   ROM Comment decreased ROM s/p L TKA   Strength (Manual Muscle Testing)   Strength Comments decreased strength s/p L TKA   Transfers   Transfers sit-stand transfer   Sit-Stand Transfer   Sit-Stand [place] (Transfers) contact guard;verbal cues   Assistive Device (Sit-Stand Transfers) walker, front-wheeled   Gait/Stairs (Locomotion)   [place] Level (Gait) contact guard;verbal cues;supervision   Assistive Device (Gait) walker, front-wheeled   Distance in Feet (Gait) 10   Pattern (Gait) step-to;3-point   Deviations/Abnormal Patterns (Gait) gait speed decreased;sergio decreased   Maintains Weight-bearing Status (Gait) able to  maintain   Clinical Impression   Criteria for Skilled Therapeutic Intervention Yes, treatment indicated   PT Diagnosis (PT) impaired functional mobility   Influenced by the following impairments pain, decreased strength   Functional limitations due to impairments gait, stairs, transfers   Clinical Presentation (PT Evaluation Complexity) stable   Clinical Presentation Rationale pt presents as medically diagnosed   Clinical Decision Making (Complexity) low complexity   Planned Therapy Interventions (PT) bed mobility training;gait training;home exercise program;patient/family education;stair training;strengthening;transfer training;ROM (range of motion)   Risk & Benefits of therapy have been explained care plan/treatment goals reviewed;patient   PT Total Evaluation Time   PT Eval, Low Complexity Minutes (49496) 10   Physical Therapy Goals   PT Frequency One time eval and treatment only   PT Predicted Duration/Target Date for Goal Attainment 08/29/24   PT Goals PT Goal 1;Gait;Stairs;Transfers   PT: Transfers Supervision/stand-by assist;Sit to/from stand;Assistive device   PT: Gait Rolling walker;50 feet;Supervision/stand-by assist;Goal Met   PT: Stairs Supervision/stand-by assist;4 stairs;Assistive device;Rail on left;Goal Met   PT: Goal 1 Independent with written HEP for LE strengthening and ROM.  (Goal Met)   Interventions   Interventions Quick Adds Therapeutic Procedure;Therapeutic Activity;Gait Training   Therapeutic Procedure/Exercise   Ther. Procedure: strength, endurance, ROM, flexibillity Minutes (32202) 10   Treatment Detail/Skilled Intervention Supine therex 10 x reps: APs, QS, GS. TKA protocol 5x reps with L LE, verbal cueing for technique, CGA: heelslides, SAQ. Verbal review of remaining exercises d/t pain. Edu provided to spouse on guiding exercises with assistance as needed. Pt and spouse verbalize understanding.   Gait Training   Gait Training Minutes (90449) 20   Symptoms Noted During/After Treatment  (Gait Training) dizziness;fatigue   Treatment Detail/Skilled Intervention Pt amb in hallway using FWW, CGA. Edu on gait sequence using FWW. Pt c/o dizziness requiring rest break, RN brings bedside chair. Extra time needed for rest. Pt stand to sit in bedside chair, CGA, VC for hand placement. Pt recovers to baseline and agreeable to stair training. Pt taken to PT gym in bedside chair. Pt BP prior to stair training 121/60. Pt encouraged to pace activity. Demo of sequence using SPC. Pt sit to stand to FWW, CGA. Pt transitions to SPC and handrail, completing 4 steps, CGA to SBA. Seated rest break. Edu on guarding provided to spouse. Pt STS from bedside chair, SBA, completes 4 steps using L handrail and SPC on R. Pt stand to sit in bedside chair, SBA. Pt encouraged to walk with nursing and spouse as able to progress distance. Pt returned to room in bedside chair. RN notified of performance, recs, and position.   Distance in Feet 85   [place] Level (Gait Training) stand-by assist   Physical Assistance Level (Gait Training) verbal cues;supervision   Weight Bearing (Gait Training) weight-bearing as tolerated   Assistive Device (Gait Training) rolling walker   Pattern Analysis (Gait Training) swing-to gait   Gait Analysis Deviations decreased sergio   Impairments (Gait Analysis/Training) pain;ROM decreased;strength decreased   Stair Railings present on left side   Physical Assist/Nonphysical Assist (Stairs) supervision;verbal cues   Level of [place] (Stairs) stand-by assist   Assistive Device (Stairs) straight cane   PT Discharge Planning   PT Plan D/C PT   PT Discharge Recommendation (DC Rec) other (see comments)   PT Rationale for DC Rec defer to ortho   PT Brief overview of current status Gait: 85 ft, CGA using FWW; Transfers: STS with SBA; Stairs: 4 stairs with SPC and L handrail, CGA to SBA.   PT Equipment Needed at Discharge walker, rolling  (FWW)   Total Session Time   Timed Code Treatment Minutes 30    Total Session Time (sum of timed and untimed services) 40

## 2024-08-30 VITALS
WEIGHT: 165 LBS | DIASTOLIC BLOOD PRESSURE: 63 MMHG | OXYGEN SATURATION: 97 % | SYSTOLIC BLOOD PRESSURE: 124 MMHG | HEART RATE: 66 BPM | RESPIRATION RATE: 16 BRPM | TEMPERATURE: 98.6 F | BODY MASS INDEX: 24.44 KG/M2 | HEIGHT: 69 IN

## 2024-08-30 LAB
FASTING STATUS PATIENT QL REPORTED: ABNORMAL
GLUCOSE SERPL-MCNC: 104 MG/DL (ref 70–99)
HGB BLD-MCNC: 10.8 G/DL (ref 11.7–15.7)

## 2024-08-30 PROCEDURE — 99215 OFFICE O/P EST HI 40 MIN: CPT | Performed by: STUDENT IN AN ORGANIZED HEALTH CARE EDUCATION/TRAINING PROGRAM

## 2024-08-30 PROCEDURE — 250N000013 HC RX MED GY IP 250 OP 250 PS 637: Performed by: STUDENT IN AN ORGANIZED HEALTH CARE EDUCATION/TRAINING PROGRAM

## 2024-08-30 PROCEDURE — 82947 ASSAY GLUCOSE BLOOD QUANT: CPT | Performed by: STUDENT IN AN ORGANIZED HEALTH CARE EDUCATION/TRAINING PROGRAM

## 2024-08-30 PROCEDURE — 85018 HEMOGLOBIN: CPT | Performed by: STUDENT IN AN ORGANIZED HEALTH CARE EDUCATION/TRAINING PROGRAM

## 2024-08-30 PROCEDURE — 36415 COLL VENOUS BLD VENIPUNCTURE: CPT | Performed by: STUDENT IN AN ORGANIZED HEALTH CARE EDUCATION/TRAINING PROGRAM

## 2024-08-30 RX ORDER — ASPIRIN 81 MG/1
81 TABLET ORAL 2 TIMES DAILY
Qty: 60 TABLET | Refills: 0 | Status: SHIPPED | OUTPATIENT
Start: 2024-08-30

## 2024-08-30 RX ORDER — ACETAMINOPHEN 325 MG/1
650 TABLET ORAL EVERY 4 HOURS PRN
Qty: 100 TABLET | Refills: 0 | Status: SHIPPED | OUTPATIENT
Start: 2024-08-31

## 2024-08-30 RX ORDER — METHOCARBAMOL 500 MG/1
500 TABLET, FILM COATED ORAL 4 TIMES DAILY PRN
Qty: 30 TABLET | Refills: 0 | Status: SHIPPED | OUTPATIENT
Start: 2024-08-30

## 2024-08-30 RX ORDER — NALOXONE HYDROCHLORIDE 0.4 MG/ML
0.2 INJECTION, SOLUTION INTRAMUSCULAR; INTRAVENOUS; SUBCUTANEOUS
Status: DISCONTINUED | OUTPATIENT
Start: 2024-08-30 | End: 2024-08-30 | Stop reason: HOSPADM

## 2024-08-30 RX ORDER — MULTIVITAMIN,THERAPEUTIC
1 TABLET ORAL DAILY
COMMUNITY
Start: 2024-08-31

## 2024-08-30 RX ORDER — OXYCODONE HYDROCHLORIDE 5 MG/1
5 TABLET ORAL EVERY 4 HOURS PRN
Qty: 33 TABLET | Refills: 0 | Status: SHIPPED | OUTPATIENT
Start: 2024-08-30

## 2024-08-30 RX ORDER — CEPHALEXIN 500 MG/1
500 CAPSULE ORAL EVERY 6 HOURS
Qty: 8 CAPSULE | Refills: 0 | Status: SHIPPED | OUTPATIENT
Start: 2024-08-30

## 2024-08-30 RX ORDER — AMOXICILLIN 250 MG
1 CAPSULE ORAL 2 TIMES DAILY
Qty: 60 TABLET | Refills: 0 | Status: SHIPPED | OUTPATIENT
Start: 2024-08-30

## 2024-08-30 RX ORDER — METHOCARBAMOL 500 MG/1
500 TABLET, FILM COATED ORAL ONCE
Status: COMPLETED | OUTPATIENT
Start: 2024-08-30 | End: 2024-08-30

## 2024-08-30 RX ORDER — NALOXONE HYDROCHLORIDE 0.4 MG/ML
0.4 INJECTION, SOLUTION INTRAMUSCULAR; INTRAVENOUS; SUBCUTANEOUS
Status: DISCONTINUED | OUTPATIENT
Start: 2024-08-30 | End: 2024-08-30 | Stop reason: HOSPADM

## 2024-08-30 RX ADMIN — CEPHALEXIN 500 MG: 500 CAPSULE ORAL at 06:01

## 2024-08-30 RX ADMIN — CLOPIDOGREL BISULFATE 75 MG: 75 TABLET ORAL at 09:20

## 2024-08-30 RX ADMIN — CARVEDILOL 1.56 MG: 3.12 TABLET, FILM COATED ORAL at 09:19

## 2024-08-30 RX ADMIN — SENNOSIDES AND DOCUSATE SODIUM 1 TABLET: 50; 8.6 TABLET ORAL at 09:20

## 2024-08-30 RX ADMIN — METHOCARBAMOL 500 MG: 500 TABLET ORAL at 06:02

## 2024-08-30 RX ADMIN — ASPIRIN 81 MG: 81 TABLET, COATED ORAL at 09:20

## 2024-08-30 RX ADMIN — CEPHALEXIN 500 MG: 500 CAPSULE ORAL at 11:43

## 2024-08-30 RX ADMIN — Medication 25 MCG: at 09:20

## 2024-08-30 RX ADMIN — ACETAMINOPHEN 975 MG: 325 TABLET ORAL at 06:01

## 2024-08-30 RX ADMIN — OXYCODONE HYDROCHLORIDE 5 MG: 5 TABLET ORAL at 10:08

## 2024-08-30 RX ADMIN — POLYETHYLENE GLYCOL 3350 17 G: 17 POWDER, FOR SOLUTION ORAL at 09:19

## 2024-08-30 RX ADMIN — THERA TABS 1 TABLET: TAB at 09:20

## 2024-08-30 RX ADMIN — BUPROPION HYDROCHLORIDE 150 MG: 150 TABLET, EXTENDED RELEASE ORAL at 09:20

## 2024-08-30 ASSESSMENT — ACTIVITIES OF DAILY LIVING (ADL)
ADLS_ACUITY_SCORE: 24

## 2024-08-30 NOTE — PROGRESS NOTES
"Orthopedic Progress Note      Assessment: 2 Days Post-Op  S/P Procedure(s):  LEFT TOTAL KNEE ARTHROPLASTY     Plan:   - Continue PT/OT.   - Weightbearing status: WBAT.  - Anticoagulation: ASA 81 mg BID, Plavix, SCDs, diana stockings and early ambulation.  - Discharge planning: Discharge to home today.     Subjective:  Pain: Well controlled on Tylenol & oxycodone.  Nausea, Vomiting:  No  Chest pain: No  Lightheadedness, Dizziness:  No  Neuro:  Patient denies new onset numbness or paresthesias in lower extremity     Patient is doing well on POD #2. Ambulating, tolerating oral intake, voiding & pain is controlled with oral medication. Right leg has muscle spasms/ twitches. Controlled with Robaxin. Ready for discharge. Hgb 10.8 (pre-op 10.8).     Objective:  /63 (BP Location: Left arm, Patient Position: Semi-Tomas's, Cuff Size: Adult Regular)   Pulse 66   Temp 98.6  F (37  C) (Oral)   Resp 16   Ht 1.753 m (5' 9\")   Wt 74.8 kg (165 lb)   SpO2 97%   BMI 24.37 kg/m    The patient is A&Ox3. Appears comfortable, sitting up at bedside.  Sensation is intact to light touch & equal bilaterally in the L2 through S1 dermatomes.  Calves are soft and non-tender.  Dorsiflexion and plantar flexion is intact bilaterally.  Appropriate flexion and extension of the toes bilaterally.   Brisk capillary refill in the toes bilaterally.   Palpable left dorsalis pedis pulse.  Left knee swollen, significant ecchymosis periwound and left calf, knee dressing C/D/I.       Pertinent Labs   Lab Results: personally reviewed.   No results found for: \"INR\", \"PROTIME\"  Lab Results   Component Value Date    HGB 10.8 (L) 08/30/2024     No results found for: \"NA\", \"CO2\"      Report completed by:  Narda Chaudhry PA-C/Dr[facility]    Date: 8/30/2024  Time: 11:10 AM    "

## 2024-08-30 NOTE — PROGRESS NOTES
Paged about muscle spasms  - give x1 robaxin and day teams to follow up  Cornelius Carr MD on 8/30/2024 at 5:53 AM

## 2024-08-30 NOTE — PROGRESS NOTES
AO x4. VSS on RA. Pain was managed with PRN oxy and 1 time dose robaxin. Denies dizziness when getting up. Tremors RLE. WBAT. IV SL. BS was 370 and 479. Straight cath 500. Pt ambulated to the bathroom SBA to void. Makes needs known. Call light within reach.

## 2024-08-30 NOTE — PLAN OF CARE
Patient vital signs are at baseline: Yes  Patient able to ambulate as they were prior to admission or with assist devices provided by therapies during their stay:  Yes  Patient MUST void prior to discharge:  Yes  Patient able to tolerate oral intake:  Yes  Pain has adequate pain control using Oral analgesics:  Yes  Does patient have an identified :  Yes  Has goal D/C date and time been discussed with patient:  Yes    Discharge instructions went over with patient and pt's  and pt was given AVS.   Questions were answered and pt stated understanding.  Pt is aware of follow up appointment(s). Pt discharging to home with family transport.

## 2024-08-30 NOTE — PROGRESS NOTES
[facility]    Medicine Progress Note - Hospitalist Service    Date of Admission:  8/28/2024    Assessment & Plan   Rosario Walls is a 54 year old female admitted on 8/28/2024. She has a past medical history significant for hyperlipidemia, hypertension, coronary artery disease status post 3 stents in 2012 complicated with LAD dissection and distal occlusion who presented to the hospital on 8/28 for elective left knee arthroplasty. Hospitalist medicine consulted for management of chronic medical conditions. Patient is recovering well. No urinary retention. No chest pain or shortness of breath. Vitals are stable.  No concern plans for discharge on 8/30    Hypertension  At home on carvedilol  Blood pressures currently around 120/60.    Plan  Continue home antihypertensives on discharge    Hyperlipidemia    Plan  Continue home atorvastatin    Urinary retention (resolved)  8/30, no urinary retention.    Depression    Plan  Continue home sertraline and peripheral    History of coronary artery disease status post 3 stents in 2012  Giving location of stents and complications with LAD dissection/distal thrombus.  Patient remains on dual antiplatelet therapy.  No chest pain or shortness of breath    Plan  Continue dual antiplatelet therapy  Continue home Imdur on discharge.  Blood pressure is stable.    Status post left knee arthroplasty    Plan  Pain management and DVT prophylaxis as per primary team               Diet: Advance Diet as Tolerated: Regular Diet Adult  Discharge Instruction - Regular Diet Adult    DVT Prophylaxis: Defer to primary service  Ernandez Catheter: Not present  Lines: None     Cardiac Monitoring: None  Code Status: Full Code      Clinically Significant Risk Factors Present on Admission                # Drug Induced Platelet Defect: home medication list includes an antiplatelet medication      # Anemia: based on hgb <11                        Disposition Plan     Medically  Ready for Discharge: Anticipated Today             DAMIAN RAY MD  Hospitalist Service  [facility]  Securely message with [employer] (more info)  Text page via [employer] Paging/Directory   ______________________________________________________________________    Interval History   No significant events.  Feeling well.  Denies any chest pain shortness of breath.  Denies any lightheadedness.  Denies any nausea or vomiting.  Denies any difficulty with urination.    Physical Exam   Vital Signs: Temp: 98.6  F (37  C) Temp src: Oral BP: 124/63 Pulse: 66   Resp: 16 SpO2: 97 % O2 Device: None (Room air)    Weight: 165 lbs 0 oz    Physical Exam  Constitutional:       General: She is not in acute distress.     Appearance: She is not toxic-appearing.   Cardiovascular:      Rate and Rhythm: Normal rate.   Pulmonary:      Effort: Pulmonary effort is normal.   Skin:     General: Skin is warm and dry.   Neurological:      Mental Status: She is alert.   Psychiatric:         Mood and Affect: Mood normal.         Thought Content: Thought content normal.         Judgment: Judgment normal.          Medical Decision Making       48 MINUTES SPENT BY ME on the date of service doing chart review, history, exam, documentation & further activities per the note.      Data     I have personally reviewed the following data over the past 24 hrs:    N/A  \   10.8 (L)   / N/A     N/A N/A N/A /  104 (H)   N/A N/A N/A \       Imaging results reviewed over the past 24 hrs:   No results found for this or any previous visit (from the past 24 hour(s)).

## 2024-08-30 NOTE — PLAN OF CARE
Patient vital signs are at baseline: Yes  Patient able to ambulate as they were prior to admission or with assist devices provided by therapies during their stay:  Yes  Patient MUST void prior to discharge:  Yes  Patient able to tolerate oral intake:  Yes  Pain has adequate pain control using Oral analgesics:  No, Had IV Dilaudid for severe pain  Does patient have an identified :  Yes  Has goal D/C date and time been discussed with patient:  Yes     Patient alert and oriented. VSS. RA.  Saline locked. Tolerating Regular diet. SBA with walker and gait belt. Voiding spontaneously, but retaining, Bladder scans done after every void for monitoring. PRN oxycodone given for pain and IV dilaudid. Call light within reach. Calls appropriately.

## (undated) DEVICE — GLOVE BIOGEL INDICATOR 7.5 LF 41675

## (undated) DEVICE — SUTURE VICRYL+ 0 27IN CT-1 UND VCP260H

## (undated) DEVICE — PREP CHLORAPREP W/ORANGE TINT 10.5ML 930715

## (undated) DEVICE — CUSTOM PACK TOTAL KNEE SOP5BTKHEC

## (undated) DEVICE — BONE CLEANING TIP INTERPULSE  0210-010-000

## (undated) DEVICE — CUSTOM PACK TOTAL KNEE ACCESSORY SOP5BTAHEA

## (undated) DEVICE — HOOD SURG T7PLUS PEEL AWAY FACE SHIELD STRL LF 0416-801-100

## (undated) DEVICE — SUCTION MANIFOLD NEPTUNE 2 SYS 4 PORT 0702-020-000

## (undated) DEVICE — HOOD SURG T7 PLUS STRL LF DISP 0416-801-200

## (undated) DEVICE — DECANTER VIAL 2006S

## (undated) DEVICE — SOL NACL 0.9% IRRIG 1000ML BOTTLE 2F7124

## (undated) DEVICE — SOL ISOPROPYL RUBBING ALCOHOL USP 70% 4OZ HDX-20 I0020

## (undated) DEVICE — HOLDER LIMB VELCRO OR 0814-1533

## (undated) DEVICE — SUTURE VICRYL+ 1 27IN CT-1 UND VCP261H

## (undated) DEVICE — SOL WATER IRRIG 1000ML BOTTLE 2F7114

## (undated) DEVICE — GLOVE BIOGEL PI INDICATOR 8.0 LF 41680

## (undated) DEVICE — CAST PADDING 6IN COTTON WEBRIL STERILE 2554

## (undated) DEVICE — BLADE SAW SAGITTAL STRK DUAL CUT 4118-135-090

## (undated) DEVICE — SU ETHIBOND 5 V-37 4X30" MB66G

## (undated) DEVICE — SUTURE MONOCRYL 3-0 18 PS2 UND MCP497G

## (undated) DEVICE — SUTURE STRATAFIX PDS 1 CTX 45CM

## (undated) DEVICE — SUTURE MONOCRYL+ 2-0 CT-1 36" UNDYED MCP945H

## (undated) DEVICE — SOLUTION IRRIG 2B7127 .9NS 3000ML BAG

## (undated) DEVICE — GLOVE BIOGEL PI ULTRATOUCH G SZ 7.5 42175

## (undated) DEVICE — ELECTRODE PATIENT RETURN ADULT L10 FT 2 PLATE CORD 0855C

## (undated) RX ORDER — DEXAMETHASONE SODIUM PHOSPHATE 4 MG/ML
INJECTION, SOLUTION INTRA-ARTICULAR; INTRALESIONAL; INTRAMUSCULAR; INTRAVENOUS; SOFT TISSUE
Status: DISPENSED
Start: 2024-08-28

## (undated) RX ORDER — PROPOFOL 10 MG/ML
INJECTION, EMULSION INTRAVENOUS
Status: DISPENSED
Start: 2024-08-28

## (undated) RX ORDER — ONDANSETRON 2 MG/ML
INJECTION INTRAMUSCULAR; INTRAVENOUS
Status: DISPENSED
Start: 2024-08-28

## (undated) RX ORDER — LIDOCAINE HYDROCHLORIDE 10 MG/ML
INJECTION, SOLUTION EPIDURAL; INFILTRATION; INTRACAUDAL; PERINEURAL
Status: DISPENSED
Start: 2024-08-28